# Patient Record
Sex: FEMALE | Race: WHITE | Employment: FULL TIME | URBAN - METROPOLITAN AREA
[De-identification: names, ages, dates, MRNs, and addresses within clinical notes are randomized per-mention and may not be internally consistent; named-entity substitution may affect disease eponyms.]

---

## 2018-01-29 ENCOUNTER — OFFICE VISIT (OUTPATIENT)
Dept: FAMILY MEDICINE CLINIC | Age: 38
End: 2018-01-29

## 2018-01-29 VITALS
RESPIRATION RATE: 15 BRPM | WEIGHT: 234 LBS | HEART RATE: 66 BPM | HEIGHT: 68 IN | SYSTOLIC BLOOD PRESSURE: 132 MMHG | DIASTOLIC BLOOD PRESSURE: 92 MMHG | OXYGEN SATURATION: 98 % | BODY MASS INDEX: 35.46 KG/M2

## 2018-01-29 DIAGNOSIS — L30.9 DERMATITIS: Primary | ICD-10-CM

## 2018-01-29 DIAGNOSIS — Z00.00 ANNUAL PHYSICAL EXAM: ICD-10-CM

## 2018-01-29 PROCEDURE — 99385 PREV VISIT NEW AGE 18-39: CPT | Performed by: FAMILY MEDICINE

## 2018-01-29 RX ORDER — TRAMADOL HYDROCHLORIDE 50 MG/1
50 TABLET ORAL EVERY 6 HOURS PRN
COMMUNITY
End: 2018-07-27

## 2018-01-29 RX ORDER — TRIAMCINOLONE ACETONIDE 5 MG/G
CREAM TOPICAL
Qty: 15 G | Refills: 1 | Status: SHIPPED | OUTPATIENT
Start: 2018-01-29 | End: 2018-02-27 | Stop reason: SDUPTHER

## 2018-01-29 ASSESSMENT — PATIENT HEALTH QUESTIONNAIRE - PHQ9
SUM OF ALL RESPONSES TO PHQ QUESTIONS 1-9: 0
1. LITTLE INTEREST OR PLEASURE IN DOING THINGS: 0
SUM OF ALL RESPONSES TO PHQ9 QUESTIONS 1 & 2: 0
2. FEELING DOWN, DEPRESSED OR HOPELESS: 0

## 2018-01-29 NOTE — PROGRESS NOTES
alert, non-toxic  HEENT:  Normocephalic, atraumatic. Pupils equal and round. TMs pearly with good landmarks. Moist mucous membranes. Normal dentition  NECK:  Supple, normal range of motion, no LAD, no meningeal signs, no JVD, nontender  CHEST/LUNGS: CTAB, no crackles, no wheeze, no rhonchi. Symmetric rise  CARDIOVASCULAR: RRR,  no murmur, no rub  ABDOMEN: Soft, non-tender, non-distended. No masses  EXTREMETIES: Normal movement of all extremities. No edema. No joint swelling. SKIN:  +erythematous raised rash on dorsum of BL toes, irregular border, +scaly with some excoriations. On of the lesions is weeping a brownish thick fluid. PSYCH:  A+O x 3; normal affect  NEURO:  GCS 15, CN2-12 grossly intact, no focal motor/sensory deficits, no cerebellar deficits, normal gait, normal speech        Assessment/Plan     44yo female here to establish care. Will update routine labs. PAP and tdap UTD. Pt has moderate dermatitis of BL feet. Does not appear infectious. Will treat with topical steroid for 2 weeks. See dermatology if no improvement. Discussed with patient medication/s dosage, instructions, potential S/E, A/R and Drug Interaction  Educational material provided regarding patient's condition  Tylenol or Motrin as needed for pain or fever  Advise to return here if worse or go to nearest ER  Encourage fluids  Pt discharged in stable condition at 16:55      1. Annual physical exam    - CBC Auto Differential; Future  - Comprehensive Metabolic Panel; Future  - Lipid, Fasting; Future  - TSH with Reflex; Future  - Hemoglobin A1C; Future    2. Dermatitis    - Leonardo- Rishi Echevarria MD  - triamcinolone (ARISTOCORT) 0.5 % cream; Apply topically 3 times daily.   Dispense: 15 g; Refill: 1      Orders Placed This Encounter   Procedures    CBC Auto Differential     Standing Status:   Future     Standing Expiration Date:   1/29/2019    Comprehensive Metabolic Panel     Standing Status:   Future     Standing Expiration

## 2018-01-29 NOTE — PATIENT INSTRUCTIONS
Patient Education        Dermatitis: Care Instructions  Your Care Instructions  Dermatitis is the general name used for any rash or inflammation of the skin. Different kinds of dermatitis cause different kinds of rashes. Common causes of a rash include new medicines, plants (such as poison oak or poison ivy), heat, and stress. Certain illnesses can also cause a rash. An allergic reaction to something that touches your skin, such as latex, nickel, or poison ivy, is called contact dermatitis. Contact dermatitis may also be caused by something that irritates the skin, such as bleach, a chemical, or soap. These types of rashes cannot be spread from person to person. How long your rash will last depends on what caused it. Rashes may last a few days or months. Follow-up care is a key part of your treatment and safety. Be sure to make and go to all appointments, and call your doctor if you are having problems. It's also a good idea to know your test results and keep a list of the medicines you take. How can you care for yourself at home? · Do not scratch the rash. Cut your nails short, and file them smooth. Or wear gloves if this helps keep you from scratching. · Wash the area with water only. Pat dry. · Put cold, wet cloths on the rash to reduce itching. · Keep cool, and stay out of the sun. · Leave the rash open to the air as much as possible. · If the rash itches, use hydrocortisone cream. Follow the directions on the label. Calamine lotion may help for plant rashes. · Take an over-the-counter antihistamine, such as diphenhydramine (Benadryl) or loratadine (Claritin), to help calm the itching. Read and follow all instructions on the label. · If your doctor prescribed a cream, use it as directed. If your doctor prescribed medicine, take it exactly as directed. When should you call for help?   Call your doctor now or seek immediate medical care if:  ? · You have symptoms of infection, such as:  ¨ Increased

## 2018-01-31 ENCOUNTER — TELEPHONE (OUTPATIENT)
Dept: FAMILY MEDICINE CLINIC | Age: 38
End: 2018-01-31

## 2018-01-31 NOTE — TELEPHONE ENCOUNTER
Pt says that she saw Dr. Myriam Benjamin recently and was given a referral to see a Dermatologist but is not able to be seen within one week. They are not accepting new patients until 07/18/2018.

## 2018-02-06 DIAGNOSIS — Z00.00 ANNUAL PHYSICAL EXAM: ICD-10-CM

## 2018-02-06 LAB
A/G RATIO: 1.5 (ref 1.1–2.2)
ALBUMIN SERPL-MCNC: 4.1 G/DL (ref 3.4–5)
ALP BLD-CCNC: 55 U/L (ref 40–129)
ALT SERPL-CCNC: 20 U/L (ref 10–40)
ANION GAP SERPL CALCULATED.3IONS-SCNC: 13 MMOL/L (ref 3–16)
AST SERPL-CCNC: 21 U/L (ref 15–37)
BASOPHILS ABSOLUTE: 0.1 K/UL (ref 0–0.2)
BASOPHILS RELATIVE PERCENT: 1 %
BILIRUB SERPL-MCNC: 0.4 MG/DL (ref 0–1)
BUN BLDV-MCNC: 7 MG/DL (ref 7–20)
CALCIUM SERPL-MCNC: 8.8 MG/DL (ref 8.3–10.6)
CHLORIDE BLD-SCNC: 104 MMOL/L (ref 99–110)
CHOLESTEROL, FASTING: 230 MG/DL (ref 0–199)
CO2: 26 MMOL/L (ref 21–32)
CREAT SERPL-MCNC: 0.6 MG/DL (ref 0.6–1.1)
EOSINOPHILS ABSOLUTE: 0.1 K/UL (ref 0–0.6)
EOSINOPHILS RELATIVE PERCENT: 1.2 %
GFR AFRICAN AMERICAN: >60
GFR NON-AFRICAN AMERICAN: >60
GLOBULIN: 2.7 G/DL
GLUCOSE BLD-MCNC: 94 MG/DL (ref 70–99)
HCT VFR BLD CALC: 38.5 % (ref 36–48)
HDLC SERPL-MCNC: 34 MG/DL (ref 40–60)
HEMOGLOBIN: 12.5 G/DL (ref 12–16)
LDL CHOLESTEROL CALCULATED: 152 MG/DL
LYMPHOCYTES ABSOLUTE: 3.1 K/UL (ref 1–5.1)
LYMPHOCYTES RELATIVE PERCENT: 37 %
MCH RBC QN AUTO: 26.6 PG (ref 26–34)
MCHC RBC AUTO-ENTMCNC: 32.4 G/DL (ref 31–36)
MCV RBC AUTO: 82.2 FL (ref 80–100)
MONOCYTES ABSOLUTE: 0.6 K/UL (ref 0–1.3)
MONOCYTES RELATIVE PERCENT: 7.3 %
NEUTROPHILS ABSOLUTE: 4.5 K/UL (ref 1.7–7.7)
NEUTROPHILS RELATIVE PERCENT: 53.5 %
PDW BLD-RTO: 15.4 % (ref 12.4–15.4)
PLATELET # BLD: 253 K/UL (ref 135–450)
PMV BLD AUTO: 8.6 FL (ref 5–10.5)
POTASSIUM SERPL-SCNC: 5.1 MMOL/L (ref 3.5–5.1)
RBC # BLD: 4.69 M/UL (ref 4–5.2)
SODIUM BLD-SCNC: 143 MMOL/L (ref 136–145)
TOTAL PROTEIN: 6.8 G/DL (ref 6.4–8.2)
TRIGLYCERIDE, FASTING: 219 MG/DL (ref 0–150)
TSH REFLEX: 2.91 UIU/ML (ref 0.27–4.2)
VLDLC SERPL CALC-MCNC: 44 MG/DL
WBC # BLD: 8.5 K/UL (ref 4–11)

## 2018-02-07 LAB
ESTIMATED AVERAGE GLUCOSE: 116.9 MG/DL
HBA1C MFR BLD: 5.7 %

## 2018-02-27 DIAGNOSIS — L30.9 DERMATITIS: ICD-10-CM

## 2018-02-27 RX ORDER — TRIAMCINOLONE ACETONIDE 5 MG/G
CREAM TOPICAL
Qty: 15 G | Refills: 0 | Status: SHIPPED | OUTPATIENT
Start: 2018-02-27 | End: 2018-05-25 | Stop reason: SDUPTHER

## 2018-05-23 LAB
ABO/RH: NORMAL
ANTIBODY SCREEN: NORMAL
HEPATITIS C ANTIBODY INTERPRETATION: NORMAL

## 2018-05-24 LAB
BASOPHILS ABSOLUTE: 0 K/UL (ref 0–0.2)
BASOPHILS RELATIVE PERCENT: 0.6 %
EOSINOPHILS ABSOLUTE: 0 K/UL (ref 0–0.6)
EOSINOPHILS RELATIVE PERCENT: 0.6 %
HCT VFR BLD CALC: 38.9 % (ref 36–48)
HEMOGLOBIN: 13.2 G/DL (ref 12–16)
HEPATITIS B SURFACE ANTIGEN INTERPRETATION: ABNORMAL
HIV AG/AB: NORMAL
HIV-1 ANTIBODY: NORMAL
LYMPHOCYTES ABSOLUTE: 2.5 K/UL (ref 1–5.1)
LYMPHOCYTES RELATIVE PERCENT: 33.2 %
MCH RBC QN AUTO: 27.6 PG (ref 26–34)
MCHC RBC AUTO-ENTMCNC: 34 G/DL (ref 31–36)
MCV RBC AUTO: 81.2 FL (ref 80–100)
MONOCYTES ABSOLUTE: 0.4 K/UL (ref 0–1.3)
MONOCYTES RELATIVE PERCENT: 5.3 %
NEUTROPHILS ABSOLUTE: 4.6 K/UL (ref 1.7–7.7)
NEUTROPHILS RELATIVE PERCENT: 60.3 %
PDW BLD-RTO: 15.8 % (ref 12.4–15.4)
PLATELET # BLD: 239 K/UL (ref 135–450)
PMV BLD AUTO: 8.7 FL (ref 5–10.5)
RBC # BLD: 4.79 M/UL (ref 4–5.2)
RPR: ABNORMAL
RUBELLA ANTIBODY IGG: 21.4 IU/ML
WBC # BLD: 7.7 K/UL (ref 4–11)

## 2018-05-25 DIAGNOSIS — L30.9 DERMATITIS: ICD-10-CM

## 2018-05-25 LAB
HPV COMMENT: NORMAL
HPV TYPE 16: NOT DETECTED
HPV TYPE 18: NOT DETECTED
HPVOH (OTHER TYPES): NOT DETECTED

## 2018-05-25 RX ORDER — TRIAMCINOLONE ACETONIDE 5 MG/G
CREAM TOPICAL
Qty: 15 G | Refills: 0 | Status: SHIPPED | OUTPATIENT
Start: 2018-05-25 | End: 2018-06-26 | Stop reason: SDUPTHER

## 2018-06-14 LAB — GLUCOSE CHALLENGE: 149 MG/DL

## 2018-06-25 ENCOUNTER — TELEPHONE (OUTPATIENT)
Dept: FAMILY MEDICINE CLINIC | Age: 38
End: 2018-06-25

## 2018-06-25 DIAGNOSIS — L30.9 DERMATITIS: ICD-10-CM

## 2018-06-26 RX ORDER — TRIAMCINOLONE ACETONIDE 5 MG/G
CREAM TOPICAL
Qty: 15 G | Refills: 0 | Status: SHIPPED | OUTPATIENT
Start: 2018-06-26 | End: 2018-07-27

## 2018-07-12 ENCOUNTER — HOSPITAL ENCOUNTER (OUTPATIENT)
Dept: OTHER | Age: 38
Discharge: HOME | End: 2018-07-12
Attending: OBSTETRICS & GYNECOLOGY | Admitting: OBSTETRICS & GYNECOLOGY

## 2018-07-12 DIAGNOSIS — O99.810 ABNORMAL GLUCOSE COMPLICATING PREGNANCY: ICD-10-CM

## 2018-07-12 LAB
GLUCOSE FASTING: 88 MG/DL
GLUCOSE TOLERANCE TEST 1 HOUR: 156 MG/DL
GLUCOSE TOLERANCE TEST 2 HOUR: 110 MG/DL
GLUCOSE TOLERANCE TEST 3 HOUR: 79 MG/DL

## 2018-08-21 ENCOUNTER — HOSPITAL ENCOUNTER (OUTPATIENT)
Dept: OTHER | Age: 38
Discharge: HOME | End: 2018-08-31

## 2018-08-22 ENCOUNTER — ROUTINE PRENATAL (OUTPATIENT)
Dept: PERINATAL CARE | Age: 38
End: 2018-08-22

## 2018-08-22 VITALS
DIASTOLIC BLOOD PRESSURE: 81 MMHG | HEART RATE: 73 BPM | WEIGHT: 237.8 LBS | SYSTOLIC BLOOD PRESSURE: 119 MMHG | BODY MASS INDEX: 36.16 KG/M2

## 2018-08-22 DIAGNOSIS — Z34.82 PRENATAL CARE, SUBSEQUENT PREGNANCY, SECOND TRIMESTER: Primary | ICD-10-CM

## 2018-08-22 PROCEDURE — 99999 PR OFFICE/OUTPT VISIT,PROCEDURE ONLY: CPT | Performed by: OBSTETRICS & GYNECOLOGY

## 2018-08-22 NOTE — PROGRESS NOTES
Consult and Level II USD,AMA, obesity,limited cardiac and kidney views on 20 week US. Reports + fetal movement, denies bleeding, leaking of fluid or pain. States infrequent BH Ctx.

## 2018-09-01 ENCOUNTER — HOSPITAL ENCOUNTER (OUTPATIENT)
Dept: OTHER | Age: 38
Discharge: HOME | End: 2018-09-01

## 2018-11-07 ENCOUNTER — HOSPITAL ENCOUNTER (OUTPATIENT)
Age: 38
Discharge: HOME | End: 2018-11-07
Attending: OBSTETRICS & GYNECOLOGY | Admitting: OBSTETRICS & GYNECOLOGY
Payer: MEDICARE

## 2018-11-07 VITALS
RESPIRATION RATE: 18 BRPM | SYSTOLIC BLOOD PRESSURE: 117 MMHG | DIASTOLIC BLOOD PRESSURE: 75 MMHG | BODY MASS INDEX: 34.67 KG/M2 | HEART RATE: 73 BPM | TEMPERATURE: 97.2 F | WEIGHT: 228 LBS

## 2018-11-07 PROCEDURE — 59025 FETAL NON-STRESS TEST: CPT

## 2018-11-07 PROCEDURE — 99211 OFF/OP EST MAY X REQ PHY/QHP: CPT

## 2018-11-08 NOTE — PROCEDURES
FETAL SURVEILLANCE TESTING SUMMARY    INDICATIONS:  decreased fetal movement    OBJECTIVE RESULTS:  Fetal heart variability: moderate  Fetal Heart Rate decelerations: rare mild variables  Fetal Heart Rate accelerations: yes  Baseline FHR: 140 per minute  Fetal Non-stress Test: reactive    Fetal surveillance: reassuring

## 2018-11-08 NOTE — PROGRESS NOTES
Department of Obstetrics and Gynecology  Labor and Delivery Triage Note        CHIEF COMPLAINT:  decreased fetal movement    HISTORY OF PRESENT ILLNESS:      The patient is a 45 y.o. female 33w3d. OB History      Para Term  AB Living    8 6 2 1 1 6    SAB TAB Ectopic Molar Multiple Live Births    1         6        Patient presents with a chief complaint as above. C/O fever, body aches starting 3 days ago, fever broke last night but still not feeling great. Presents to triage today with c/o not feeling baby moving as much today    Estimated Due Date:  Estimated Date of Delivery: 18    PAST MEDICAL HISTORY:   Past Medical History:   Diagnosis Date    Anemia     Cervix dysplasia     Depression     Kidney stones        PAST  SURGICAL HISTORY:   Past Surgical History:   Procedure Laterality Date    CHOLECYSTECTOMY      LASIK      TONSILLECTOMY         SOCIAL HISTORY:     reports that she has never smoked. She has never used smokeless tobacco. She reports that she does not drink alcohol or use drugs. MEDICATIONS:    Prior to Admission medications    Medication Sig Start Date End Date Taking? Authorizing Provider   Prenatal Multivit-Min-Fe-FA (PRENATAL VITAMINS PO) Take 1 tablet by mouth daily   Yes Historical Provider, MD   progesterone (PROMETRIUM) 200 MG capsule Take 1 capsule by mouth nightly for 21 doses 18  Marvel Dhaliwal DO        PRENATAL CARE:    Complicated by: none    REVIEW OF SYSTEMS:     Pertinent items are noted in HPI. PHYSICAL EXAM:    Vital Signs: Blood pressure 117/75, pulse 73, temperature 96.8 °F (36 °C), temperature source Temporal, resp. rate 18, weight 228 lb (103.4 kg), last menstrual period 2018, not currently breastfeeding. Abdomen soft, non tender, consistent with her EGA.     Fetal heart rate:  Baseline Heart Rate 140, accelerations:  present  long term variability:  moderate  decelerations:  Rare mild variables    Contraction

## 2018-11-16 ENCOUNTER — HOSPITAL ENCOUNTER (OUTPATIENT)
Age: 38
Discharge: HOME | End: 2018-11-16
Attending: OBSTETRICS & GYNECOLOGY | Admitting: OBSTETRICS & GYNECOLOGY
Payer: MEDICARE

## 2018-11-16 VITALS
BODY MASS INDEX: 34.87 KG/M2 | DIASTOLIC BLOOD PRESSURE: 72 MMHG | RESPIRATION RATE: 18 BRPM | TEMPERATURE: 96.7 F | HEIGHT: 68 IN | SYSTOLIC BLOOD PRESSURE: 127 MMHG | HEART RATE: 68 BPM | WEIGHT: 230.1 LBS

## 2018-11-16 PROBLEM — O47.9 THREATENED LABOR: Status: ACTIVE | Noted: 2018-11-16

## 2018-11-16 PROCEDURE — 6360000002 HC RX W HCPCS

## 2018-11-16 PROCEDURE — 59025 FETAL NON-STRESS TEST: CPT

## 2018-11-16 PROCEDURE — 99211 OFF/OP EST MAY X REQ PHY/QHP: CPT

## 2018-11-16 PROCEDURE — 96372 THER/PROPH/DIAG INJ SC/IM: CPT

## 2018-11-16 RX ORDER — TERBUTALINE SULFATE 1 MG/ML
0.25 INJECTION, SOLUTION SUBCUTANEOUS ONCE
Status: COMPLETED | OUTPATIENT
Start: 2018-11-16 | End: 2018-11-16

## 2018-11-16 RX ORDER — TERBUTALINE SULFATE 1 MG/ML
INJECTION, SOLUTION SUBCUTANEOUS
Status: COMPLETED
Start: 2018-11-16 | End: 2018-11-16

## 2018-11-16 RX ADMIN — TERBUTALINE SULFATE 0.25 MG: 1 INJECTION SUBCUTANEOUS at 17:43

## 2018-11-16 RX ADMIN — TERBUTALINE SULFATE 0.25 MG: 1 INJECTION, SOLUTION SUBCUTANEOUS at 17:43

## 2018-11-16 NOTE — FLOWSHEET NOTE
Pt. Evaluated for discomfort and states contractions less intense and not lasting as long. Irritability pattern present on monitor, 2nd pitcher of water given.  Will continue to monitor

## 2018-12-07 ENCOUNTER — HOSPITAL ENCOUNTER (INPATIENT)
Age: 38
LOS: 1 days | Discharge: HOME | DRG: 560 | End: 2018-12-08
Attending: OBSTETRICS & GYNECOLOGY | Admitting: OBSTETRICS & GYNECOLOGY
Payer: MEDICARE

## 2018-12-07 ENCOUNTER — ANESTHESIA (OUTPATIENT)
Dept: LABOR AND DELIVERY | Age: 38
DRG: 560 | End: 2018-12-07
Payer: MEDICARE

## 2018-12-07 ENCOUNTER — ANESTHESIA EVENT (OUTPATIENT)
Dept: LABOR AND DELIVERY | Age: 38
DRG: 560 | End: 2018-12-07
Payer: MEDICARE

## 2018-12-07 PROBLEM — O36.5930 POOR FETAL GROWTH AFFECTING MANAGEMENT OF MOTHER IN THIRD TRIMESTER: Status: ACTIVE | Noted: 2018-12-07

## 2018-12-07 PROBLEM — Z37.9 NORMAL LABOR: Status: ACTIVE | Noted: 2018-12-07

## 2018-12-07 LAB
ABO/RH: NORMAL
AMPHETAMINE SCREEN, URINE: NORMAL
ANTIBODY SCREEN: NORMAL
BARBITURATE SCREEN URINE: NORMAL
BASOPHILS ABSOLUTE: 0.1 K/UL (ref 0–0.2)
BASOPHILS RELATIVE PERCENT: 0.4 %
BENZODIAZEPINE SCREEN, URINE: NORMAL
BUPRENORPHINE URINE: NORMAL
CANNABINOID SCREEN URINE: NORMAL
COCAINE METABOLITE SCREEN URINE: NORMAL
EOSINOPHILS ABSOLUTE: 0 K/UL (ref 0–0.6)
EOSINOPHILS RELATIVE PERCENT: 0.2 %
HCT VFR BLD CALC: 35.1 % (ref 36–48)
HEMOGLOBIN: 11.5 G/DL (ref 12–16)
LYMPHOCYTES ABSOLUTE: 2.4 K/UL (ref 1–5.1)
LYMPHOCYTES RELATIVE PERCENT: 18.4 %
Lab: NORMAL
MCH RBC QN AUTO: 25.9 PG (ref 26–34)
MCHC RBC AUTO-ENTMCNC: 32.9 G/DL (ref 31–36)
MCV RBC AUTO: 78.7 FL (ref 80–100)
METHADONE SCREEN, URINE: NORMAL
MONOCYTES ABSOLUTE: 0.5 K/UL (ref 0–1.3)
MONOCYTES RELATIVE PERCENT: 3.7 %
NEUTROPHILS ABSOLUTE: 10 K/UL (ref 1.7–7.7)
NEUTROPHILS RELATIVE PERCENT: 77.3 %
OPIATE SCREEN URINE: NORMAL
OXYCODONE URINE: NORMAL
PDW BLD-RTO: 14.7 % (ref 12.4–15.4)
PH UA: 6
PHENCYCLIDINE SCREEN URINE: NORMAL
PLATELET # BLD: 277 K/UL (ref 135–450)
PMV BLD AUTO: 9.2 FL (ref 5–10.5)
PROPOXYPHENE SCREEN: NORMAL
RBC # BLD: 4.46 M/UL (ref 4–5.2)
WBC # BLD: 13 K/UL (ref 4–11)

## 2018-12-07 PROCEDURE — 86850 RBC ANTIBODY SCREEN: CPT

## 2018-12-07 PROCEDURE — 2500000003 HC RX 250 WO HCPCS: Performed by: NURSE ANESTHETIST, CERTIFIED REGISTERED

## 2018-12-07 PROCEDURE — 80307 DRUG TEST PRSMV CHEM ANLYZR: CPT

## 2018-12-07 PROCEDURE — 1200000000 HC SEMI PRIVATE

## 2018-12-07 PROCEDURE — 85025 COMPLETE CBC W/AUTO DIFF WBC: CPT

## 2018-12-07 PROCEDURE — 2580000003 HC RX 258

## 2018-12-07 PROCEDURE — 3700000025 ANESTHESIA EPIDURAL BLOCK: Performed by: ANESTHESIOLOGY

## 2018-12-07 PROCEDURE — 1220000000 HC SEMI PRIVATE OB R&B

## 2018-12-07 PROCEDURE — 86780 TREPONEMA PALLIDUM: CPT

## 2018-12-07 PROCEDURE — 86900 BLOOD TYPING SEROLOGIC ABO: CPT

## 2018-12-07 PROCEDURE — 3E033VJ INTRODUCTION OF OTHER HORMONE INTO PERIPHERAL VEIN, PERCUTANEOUS APPROACH: ICD-10-PCS | Performed by: OBSTETRICS & GYNECOLOGY

## 2018-12-07 PROCEDURE — 6360000002 HC RX W HCPCS: Performed by: OBSTETRICS & GYNECOLOGY

## 2018-12-07 PROCEDURE — 86901 BLOOD TYPING SEROLOGIC RH(D): CPT

## 2018-12-07 PROCEDURE — 10907ZC DRAINAGE OF AMNIOTIC FLUID, THERAPEUTIC FROM PRODUCTS OF CONCEPTION, VIA NATURAL OR ARTIFICIAL OPENING: ICD-10-PCS | Performed by: OBSTETRICS & GYNECOLOGY

## 2018-12-07 PROCEDURE — 6370000000 HC RX 637 (ALT 250 FOR IP): Performed by: OBSTETRICS & GYNECOLOGY

## 2018-12-07 PROCEDURE — 7200000001 HC VAGINAL DELIVERY

## 2018-12-07 PROCEDURE — 6370000000 HC RX 637 (ALT 250 FOR IP)

## 2018-12-07 RX ORDER — METHYLERGONOVINE MALEATE 0.2 MG/ML
200 INJECTION INTRAVENOUS
Status: ACTIVE | OUTPATIENT
Start: 2018-12-07 | End: 2018-12-07

## 2018-12-07 RX ORDER — PHYTONADIONE 1 MG/.5ML
INJECTION, EMULSION INTRAMUSCULAR; INTRAVENOUS; SUBCUTANEOUS
Status: DISCONTINUED
Start: 2018-12-07 | End: 2018-12-07 | Stop reason: WASHOUT

## 2018-12-07 RX ORDER — ACETAMINOPHEN 325 MG/1
650 TABLET ORAL EVERY 4 HOURS PRN
Status: DISCONTINUED | OUTPATIENT
Start: 2018-12-07 | End: 2018-12-09 | Stop reason: HOSPADM

## 2018-12-07 RX ORDER — SODIUM CHLORIDE 0.9 % (FLUSH) 0.9 %
10 SYRINGE (ML) INJECTION PRN
Status: DISCONTINUED | OUTPATIENT
Start: 2018-12-07 | End: 2018-12-07

## 2018-12-07 RX ORDER — IBUPROFEN 800 MG/1
800 TABLET ORAL EVERY 6 HOURS PRN
Qty: 40 TABLET | Refills: 0 | Status: SHIPPED | OUTPATIENT
Start: 2018-12-07 | End: 2020-11-02

## 2018-12-07 RX ORDER — CARBOPROST TROMETHAMINE 250 UG/ML
250 INJECTION, SOLUTION INTRAMUSCULAR
Status: ACTIVE | OUTPATIENT
Start: 2018-12-07 | End: 2018-12-07

## 2018-12-07 RX ORDER — DOCUSATE SODIUM 100 MG/1
100 CAPSULE, LIQUID FILLED ORAL 2 TIMES DAILY
Status: DISCONTINUED | OUTPATIENT
Start: 2018-12-07 | End: 2018-12-07

## 2018-12-07 RX ORDER — ONDANSETRON 2 MG/ML
4 INJECTION INTRAMUSCULAR; INTRAVENOUS EVERY 6 HOURS PRN
Status: DISCONTINUED | OUTPATIENT
Start: 2018-12-07 | End: 2018-12-09 | Stop reason: HOSPADM

## 2018-12-07 RX ORDER — BUPIVACAINE HYDROCHLORIDE 2.5 MG/ML
INJECTION, SOLUTION EPIDURAL; INFILTRATION; INTRACAUDAL PRN
Status: DISCONTINUED | OUTPATIENT
Start: 2018-12-07 | End: 2018-12-07 | Stop reason: SDUPTHER

## 2018-12-07 RX ORDER — SODIUM CHLORIDE, SODIUM LACTATE, POTASSIUM CHLORIDE, CALCIUM CHLORIDE 600; 310; 30; 20 MG/100ML; MG/100ML; MG/100ML; MG/100ML
INJECTION, SOLUTION INTRAVENOUS CONTINUOUS
Status: DISCONTINUED | OUTPATIENT
Start: 2018-12-07 | End: 2018-12-07

## 2018-12-07 RX ORDER — LANOLIN 100 %
OINTMENT (GRAM) TOPICAL PRN
Status: DISCONTINUED | OUTPATIENT
Start: 2018-12-07 | End: 2018-12-09 | Stop reason: HOSPADM

## 2018-12-07 RX ORDER — FERROUS SULFATE 325(65) MG
325 TABLET ORAL DAILY
Status: DISCONTINUED | OUTPATIENT
Start: 2018-12-08 | End: 2018-12-09 | Stop reason: HOSPADM

## 2018-12-07 RX ORDER — ERYTHROMYCIN 5 MG/G
OINTMENT OPHTHALMIC
Status: DISCONTINUED
Start: 2018-12-07 | End: 2018-12-07 | Stop reason: WASHOUT

## 2018-12-07 RX ORDER — SODIUM CHLORIDE 0.9 % (FLUSH) 0.9 %
SYRINGE (ML) INJECTION
Status: DISCONTINUED
Start: 2018-12-07 | End: 2018-12-07

## 2018-12-07 RX ORDER — SENNA AND DOCUSATE SODIUM 50; 8.6 MG/1; MG/1
1 TABLET, FILM COATED ORAL DAILY PRN
Status: DISCONTINUED | OUTPATIENT
Start: 2018-12-07 | End: 2018-12-09 | Stop reason: HOSPADM

## 2018-12-07 RX ORDER — IBUPROFEN 600 MG/1
600 TABLET ORAL EVERY 6 HOURS SCHEDULED
Status: DISCONTINUED | OUTPATIENT
Start: 2018-12-08 | End: 2018-12-09 | Stop reason: HOSPADM

## 2018-12-07 RX ORDER — MISOPROSTOL 100 UG/1
800 TABLET ORAL PRN
Status: DISCONTINUED | OUTPATIENT
Start: 2018-12-07 | End: 2018-12-09 | Stop reason: HOSPADM

## 2018-12-07 RX ORDER — SODIUM CHLORIDE 0.9 % (FLUSH) 0.9 %
10 SYRINGE (ML) INJECTION EVERY 12 HOURS SCHEDULED
Status: DISCONTINUED | OUTPATIENT
Start: 2018-12-07 | End: 2018-12-07

## 2018-12-07 RX ORDER — LIDOCAINE HYDROCHLORIDE AND EPINEPHRINE 20; 5 MG/ML; UG/ML
INJECTION, SOLUTION EPIDURAL; INFILTRATION; INTRACAUDAL; PERINEURAL PRN
Status: DISCONTINUED | OUTPATIENT
Start: 2018-12-07 | End: 2018-12-07 | Stop reason: SDUPTHER

## 2018-12-07 RX ORDER — FAMOTIDINE 20 MG/1
20 TABLET, FILM COATED ORAL 2 TIMES DAILY PRN
Status: DISCONTINUED | OUTPATIENT
Start: 2018-12-07 | End: 2018-12-09 | Stop reason: HOSPADM

## 2018-12-07 RX ORDER — SIMETHICONE 80 MG
80 TABLET,CHEWABLE ORAL EVERY 6 HOURS PRN
Status: DISCONTINUED | OUTPATIENT
Start: 2018-12-07 | End: 2018-12-09 | Stop reason: HOSPADM

## 2018-12-07 RX ORDER — OXYCODONE HYDROCHLORIDE AND ACETAMINOPHEN 5; 325 MG/1; MG/1
1 TABLET ORAL EVERY 4 HOURS PRN
Status: DISCONTINUED | OUTPATIENT
Start: 2018-12-07 | End: 2018-12-09 | Stop reason: HOSPADM

## 2018-12-07 RX ORDER — OXYCODONE HYDROCHLORIDE AND ACETAMINOPHEN 5; 325 MG/1; MG/1
2 TABLET ORAL EVERY 4 HOURS PRN
Status: DISCONTINUED | OUTPATIENT
Start: 2018-12-07 | End: 2018-12-09 | Stop reason: HOSPADM

## 2018-12-07 RX ORDER — SODIUM CHLORIDE 0.9 % (FLUSH) 0.9 %
10 SYRINGE (ML) INJECTION PRN
Status: DISCONTINUED | OUTPATIENT
Start: 2018-12-07 | End: 2018-12-09 | Stop reason: HOSPADM

## 2018-12-07 RX ORDER — ONDANSETRON 2 MG/ML
4 INJECTION INTRAMUSCULAR; INTRAVENOUS EVERY 6 HOURS PRN
Status: DISCONTINUED | OUTPATIENT
Start: 2018-12-07 | End: 2018-12-07

## 2018-12-07 RX ORDER — ONDANSETRON 4 MG/1
4 TABLET, ORALLY DISINTEGRATING ORAL EVERY 6 HOURS PRN
Status: DISCONTINUED | OUTPATIENT
Start: 2018-12-07 | End: 2018-12-09 | Stop reason: HOSPADM

## 2018-12-07 RX ORDER — SODIUM CHLORIDE, SODIUM LACTATE, POTASSIUM CHLORIDE, CALCIUM CHLORIDE 600; 310; 30; 20 MG/100ML; MG/100ML; MG/100ML; MG/100ML
INJECTION, SOLUTION INTRAVENOUS
Status: COMPLETED
Start: 2018-12-07 | End: 2018-12-07

## 2018-12-07 RX ORDER — OXYCODONE HYDROCHLORIDE AND ACETAMINOPHEN 5; 325 MG/1; MG/1
1-2 TABLET ORAL EVERY 4 HOURS PRN
Qty: 10 TABLET | Refills: 0 | Status: SHIPPED | OUTPATIENT
Start: 2018-12-07 | End: 2019-12-07

## 2018-12-07 RX ORDER — DOCUSATE SODIUM 100 MG/1
100 CAPSULE, LIQUID FILLED ORAL 2 TIMES DAILY
Status: DISCONTINUED | OUTPATIENT
Start: 2018-12-07 | End: 2018-12-09 | Stop reason: HOSPADM

## 2018-12-07 RX ORDER — SODIUM CHLORIDE 0.9 % (FLUSH) 0.9 %
10 SYRINGE (ML) INJECTION EVERY 12 HOURS SCHEDULED
Status: DISCONTINUED | OUTPATIENT
Start: 2018-12-07 | End: 2018-12-09 | Stop reason: HOSPADM

## 2018-12-07 RX ADMIN — Medication 1 MILLI-UNITS/MIN: at 15:36

## 2018-12-07 RX ADMIN — SODIUM CHLORIDE, POTASSIUM CHLORIDE, SODIUM LACTATE AND CALCIUM CHLORIDE: 600; 310; 30; 20 INJECTION, SOLUTION INTRAVENOUS at 14:11

## 2018-12-07 RX ADMIN — BENZOCAINE AND LEVOMENTHOL: 200; 5 SPRAY TOPICAL at 22:25

## 2018-12-07 RX ADMIN — BUPIVACAINE HYDROCHLORIDE 10 ML: 2.5 INJECTION, SOLUTION EPIDURAL; INFILTRATION; INTRACAUDAL; PERINEURAL at 19:27

## 2018-12-07 RX ADMIN — SODIUM CHLORIDE, SODIUM LACTATE, POTASSIUM CHLORIDE, CALCIUM CHLORIDE: 600; 310; 30; 20 INJECTION, SOLUTION INTRAVENOUS at 14:11

## 2018-12-07 RX ADMIN — DOCUSATE SODIUM 100 MG: 100 CAPSULE, LIQUID FILLED ORAL at 22:24

## 2018-12-07 RX ADMIN — LIDOCAINE HYDROCHLORIDE AND EPINEPHRINE 5 ML: 20; 5 INJECTION, SOLUTION EPIDURAL; INFILTRATION; INTRACAUDAL; PERINEURAL at 19:24

## 2018-12-07 RX ADMIN — Medication 12 ML/HR: at 19:35

## 2018-12-07 RX ADMIN — ACETAMINOPHEN 650 MG: 325 TABLET, FILM COATED ORAL at 22:24

## 2018-12-07 ASSESSMENT — PAIN SCALES - GENERAL: PAINLEVEL_OUTOF10: 0

## 2018-12-07 NOTE — ANESTHESIA PRE PROCEDURE
Department of Anesthesiology  Preprocedure Note       Name:  Will Phillip   Age:  45 y.o.  :  1980                                          MRN:  [id number]         Date:  2018      Surgeon: * No surgeons listed *    Procedure: ANESTHESIA LABOR ANALGESIA    Medications prior to admission:   Prior to Admission medications    Medication Sig Start Date End Date Taking? Authorizing Provider   NONFORMULARY 1 tablet 3 times daily as needed (30 minutes before meals)   Yes Historical Provider, MD   Prenatal Multivit-Min-Fe-FA (PRENATAL VITAMINS PO) Take 1 tablet by mouth daily   Yes Historical Provider, MD       Current medications:    Current Facility-Administered Medications   Medication Dose Route Frequency Provider Last Rate Last Dose    lactated ringers infusion   Intravenous Continuous Catalina Joyce Osborne MD        sodium chloride flush 0.9 % injection 10 mL  10 mL Intravenous 2 times per day Naldo Rubio MD        sodium chloride flush 0.9 % injection 10 mL  10 mL Intravenous PRN Naldo Rubio MD        docusate sodium (COLACE) capsule 100 mg  100 mg Oral BID Naldo Rubio MD        ondansetron (ZOFRAN) injection 4 mg  4 mg Intravenous Q6H PRN Naldo Rubio MD        oxytocin (PITOCIN) 30 units in 500 mL infusion  1 anjelica-units/min Intravenous Continuous PRN Catalina Joyce Osborne MD        sodium chloride flush 0.9 % injection                Allergies:     Allergies   Allergen Reactions    Demerol      UNKNOWN    Monistat [Tioconazole]     Pineapple Itching       Problem List:    Patient Active Problem List   Diagnosis Code    Injury of ankle, right S99.911A    Fracture, fibula S82.409A    Chest pain R07.9    Threatened labor O47.9    Poor fetal growth affecting management of mother in third trimester O45.26    Normal labor O80, Z37.9       Past Medical History:        Diagnosis Date    Abnormal Pap smear of cervix     in past, recent normal    Anemia     Cervix Date                       WBC                      10.8                07/27/2018                 HGB                      12.4                07/27/2018                 HCT                      36.7                07/27/2018                 MCV                      82.9                07/27/2018                 PLT                      221                 07/27/2018            )        Anesthetic plan and risks discussed with patient. Use of blood products discussed with patient whom consented to blood products.                    Sameer Pollock, APRN - CRNA   12/7/2018

## 2018-12-08 VITALS
HEIGHT: 68 IN | WEIGHT: 232 LBS | TEMPERATURE: 98.2 F | BODY MASS INDEX: 35.16 KG/M2 | HEART RATE: 72 BPM | OXYGEN SATURATION: 100 % | DIASTOLIC BLOOD PRESSURE: 80 MMHG | RESPIRATION RATE: 18 BRPM | SYSTOLIC BLOOD PRESSURE: 118 MMHG

## 2018-12-08 LAB — TOTAL SYPHILLIS IGG/IGM: NORMAL

## 2018-12-08 PROCEDURE — 2580000003 HC RX 258: Performed by: OBSTETRICS & GYNECOLOGY

## 2018-12-08 PROCEDURE — 6370000000 HC RX 637 (ALT 250 FOR IP): Performed by: OBSTETRICS & GYNECOLOGY

## 2018-12-08 PROCEDURE — 7200000001 HC VAGINAL DELIVERY

## 2018-12-08 PROCEDURE — 1200000000 HC SEMI PRIVATE

## 2018-12-08 RX ADMIN — IBUPROFEN 600 MG: 600 TABLET ORAL at 12:30

## 2018-12-08 RX ADMIN — Medication 10 ML: at 08:50

## 2018-12-08 RX ADMIN — DOCUSATE SODIUM 100 MG: 100 CAPSULE, LIQUID FILLED ORAL at 08:50

## 2018-12-08 RX ADMIN — DOCUSATE SODIUM 100 MG: 100 CAPSULE, LIQUID FILLED ORAL at 20:09

## 2018-12-08 RX ADMIN — IBUPROFEN 600 MG: 600 TABLET ORAL at 04:30

## 2018-12-08 RX ADMIN — IBUPROFEN 600 MG: 600 TABLET ORAL at 20:09

## 2018-12-08 ASSESSMENT — PAIN SCALES - GENERAL
PAINLEVEL_OUTOF10: 0
PAINLEVEL_OUTOF10: 2
PAINLEVEL_OUTOF10: 0

## 2018-12-08 NOTE — PROGRESS NOTES
[facility] post-partum note    Post-partum Day #0    Subjective:    Doing well, very keen to go home tonight  Lochia: Normal    Objective:  Vitals:    12/07/18 2230 12/08/18 0020 12/08/18 0820 12/08/18 1225   BP: 125/68 123/81 96/64 106/71   Pulse: 72 70 74 74   Resp: 16 18 16 18   Temp: 97.5 °F (36.4 °C) 98.4 °F (36.9 °C) 97 °F (36.1 °C) 98.4 °F (36.9 °C)   TempSrc: Axillary Oral Oral Oral   SpO2:       Weight:       Height:           Physical Examination:  Appears well  Uterus: Firm, NT  Calves: NT    Lab Results   Component Value Date    WBC 13.0 (H) 12/07/2018    HGB 11.5 (L) 12/07/2018    HCT 35.1 (L) 12/07/2018    MCV 78.7 (L) 12/07/2018     12/07/2018          Current Facility-Administered Medications:     sodium chloride flush 0.9 % injection 10 mL, 10 mL, Intravenous, 2 times per day, Charity Wilson MD, 10 mL at 12/08/18 0850    sodium chloride flush 0.9 % injection 10 mL, 10 mL, Intravenous, PRN, Charity Wilson MD    rho(D) immune globulin (HYPERRHO S/D) injection 300 mcg, 300 mcg, Intramuscular, Once, Charity Wilson MD    acetaminophen (TYLENOL) tablet 650 mg, 650 mg, Oral, Q4H PRN, Jason Itzel GUZMAN MD, 650 mg at 12/07/18 2224    ibuprofen (ADVIL;MOTRIN) tablet 600 mg, 600 mg, Oral, 4 times per day, Charity Wilson MD, 600 mg at 12/08/18 1230    oxyCODONE-acetaminophen (PERCOCET) 5-325 MG per tablet 1 tablet, 1 tablet, Oral, Q4H PRN **OR** oxyCODONE-acetaminophen (PERCOCET) 5-325 MG per tablet 2 tablet, 2 tablet, Oral, Q4H PRN, Charity Wilson MD    simethicone (MYLICON) chewable tablet 80 mg, 80 mg, Oral, Q6H PRN, Charity Wilson MD    docusate sodium (COLACE) capsule 100 mg, 100 mg, Oral, BID, Jason Itzel GUZMAN MD, 100 mg at 12/08/18 0850    magnesium hydroxide (MILK OF MAGNESIA) 400 MG/5ML suspension 30 mL, 30 mL, Oral, Daily PRN, Charity Wilson MD    sennosides-docusate sodium (SENOKOT-S) 8.6-50 MG tablet 1 tablet, 1 tablet, Oral, Daily

## 2018-12-08 NOTE — ANESTHESIA PROCEDURE NOTES
Epidural Block    Patient location during procedure: OB  Start time: 2018 7:13 PM  End time: 2018 7:37 PM  Reason for block: labor epidural  Staffing  Anesthesiologist: Nino Galindo  Resident/CRNA: Martín Moyer  Performed: resident/CRNA   Preanesthetic Checklist  Completed: patient identified, site marked, pre-op evaluation, timeout performed, IV checked, risks and benefits discussed, monitors and equipment checked, anesthesia consent given, oxygen available and patient being monitored  Epidural  Patient position: sitting  Prep: ChloraPrep  Patient monitoring: continuous pulse ox and frequent blood pressure checks  Approach: midline  Location: lumbar (1-5)  Injection technique: IDALIA saline  Provider prep: mask and sterile gloves  Needle  Needle type: Tuohy   Needle gauge: 17 G  Needle length: 3.5 in  Needle insertion depth: 7 cm  Catheter type: end hole  Catheter size: 19 G  Catheter at skin depth: 12 cm  Test dose: negative  Assessment  Sensory level: T10  Hemodynamics: stable  Attempts: 1  Additional Notes  Called for labor epidural analgesia request. Medical and Surgical history reviewed with pt. Risks/benefits of epidural discussed including allergic reaction, infection, bleeding, hypotension, headache, back pain, nerve damage, failed or one-sided block. Also discussed anesthesia options and associated risks in the event of . Questions answered. Verbalizes understanding and requests to proceed. VSS & FHR stable. Pt in sitting position. Labor epidural placed using IDALIA sterile technique (donned mask, hat, and sterile gloves). Back prepped with Chloraprepx2. Sterile drape applied. Site infiltrated with 1%Lidocaine 5ml. 17G Tuohy needle inserted, IDALIA technique with saline. No heme, CSF, or paresthesias noted. Epidural space dilated with saline. Threaded epidural catheter through Tuohy needle easily. Tuohy needle withdrawn. Negative aspiration.  5ml of 1.5% Lido with epi 1:200,000 test dose given. Negative test dose. Catheter taped at the skin. Secured with steri strips, tegaderm, and tape. Patient in supine position with left uterine displacement. VSS & FHR stable.      VAS: start 9 /10, end  0/10

## 2019-06-19 ENCOUNTER — TELEPHONE (OUTPATIENT)
Dept: FAMILY MEDICINE CLINIC | Age: 39
End: 2019-06-19

## 2020-10-09 ENCOUNTER — APPOINTMENT (OUTPATIENT)
Dept: GENERAL RADIOLOGY | Age: 40
End: 2020-10-09
Payer: MEDICARE

## 2020-10-09 ENCOUNTER — HOSPITAL ENCOUNTER (EMERGENCY)
Age: 40
Discharge: HOME | End: 2020-10-09
Payer: MEDICARE

## 2020-10-09 VITALS
OXYGEN SATURATION: 98 % | DIASTOLIC BLOOD PRESSURE: 85 MMHG | WEIGHT: 225 LBS | RESPIRATION RATE: 12 BRPM | HEART RATE: 73 BPM | SYSTOLIC BLOOD PRESSURE: 121 MMHG | BODY MASS INDEX: 34.1 KG/M2 | TEMPERATURE: 97.4 F | HEIGHT: 68 IN

## 2020-10-09 PROCEDURE — 73140 X-RAY EXAM OF FINGER(S): CPT

## 2020-10-09 PROCEDURE — 99283 EMERGENCY DEPT VISIT LOW MDM: CPT

## 2020-10-09 RX ORDER — NAPROXEN 500 MG/1
500 TABLET ORAL 2 TIMES DAILY PRN
Qty: 20 TABLET | Refills: 0 | Status: SHIPPED | OUTPATIENT
Start: 2020-10-09 | End: 2020-11-02

## 2020-10-09 ASSESSMENT — ENCOUNTER SYMPTOMS
COLOR CHANGE: 1
VOMITING: 0
DIARRHEA: 0
CHEST TIGHTNESS: 0
SHORTNESS OF BREATH: 0
NAUSEA: 0
ABDOMINAL PAIN: 0

## 2020-10-09 ASSESSMENT — PAIN DESCRIPTION - PAIN TYPE: TYPE: ACUTE PAIN

## 2020-10-09 ASSESSMENT — PAIN DESCRIPTION - ORIENTATION: ORIENTATION: LEFT

## 2020-10-09 ASSESSMENT — PAIN SCALES - GENERAL: PAINLEVEL_OUTOF10: 5

## 2020-10-09 ASSESSMENT — PAIN DESCRIPTION - LOCATION: LOCATION: FINGER (COMMENT WHICH ONE)

## 2020-10-09 NOTE — ED PROVIDER NOTES
[facility]        Pt Name: Josiane Arellano  MRN: [id number]  Griceldagfjohnny 1980  Date of evaluation: 10/9/2020  Provider: Callie Le PA-C  PCP: Danial Smith MD    GREGORIO. I have evaluated this patient. My supervising physician was available for consultation. CHIEF COMPLAINT       Chief Complaint   Patient presents with    Finger Pain     left 4th digit pain, no injury onset tuesday        HISTORY OF PRESENT ILLNESS   (Location, Timing/Onset, Context/Setting, Quality, Duration, Modifying Factors, Severity, Associated Signs and Symptoms)  Note limiting factors. Josiane Arellano is a 44 y.o. female who is right-hand dominant presents the emergency department with difficulties as a pertains to ring finger pain. Patient states she cannot remember any particular injury. She states she is sure that there could have been something low-grade but nothing that was substantial.  She states that since Tuesday she has noticed some discoloration on the dorsal aspect of her hand over the MCP and some pain and associated swelling just distal to it over the proximal phalanx. She states that she has not had any skin changes. Denies any redness. Denies any warmth but does have swelling that she says has not worsened since it began. She states she is treated this with Advil and Naprosyn in the home environment but really has not improved. She states that she is not having any numbness and or tingling. She has not had any difficulties such as this in the past and because of achy pain and discomfort rating it to be 5 out of 10 she presents to the ED for evaluation and treatment. She denies numbness and or tingling. She denies a history of gout. She has no additional musculoskeletal complaints arthralgias or associated joint swelling or effusion. No additional concerns at all at present.     Nursing Notes were all reviewed and agreed with or any disagreements were addressed in the HPI. REVIEW OF SYSTEMS    (2-9 systems for level 4, 10 or more for level 5)     Review of Systems   Constitutional: Negative for activity change, chills and fever. Respiratory: Negative for chest tightness and shortness of breath. Cardiovascular: Negative for chest pain. Gastrointestinal: Negative for abdominal pain, diarrhea, nausea and vomiting. Genitourinary: Negative for dysuria and flank pain. Musculoskeletal: Positive for arthralgias. Skin: Positive for color change. Neurological: Negative for seizures, syncope, numbness and headaches. Positives and Pertinent negatives as per HPI. Except as noted above in the ROS, all other systems were reviewed and negative.        PAST MEDICAL HISTORY     Past Medical History:   Diagnosis Date    Abnormal Pap smear of cervix     in past, recent normal    Anemia     Cervix dysplasia     Depression     no meds currently    Kidney stones     kidney stones    Postpartum depression     Zoloft helped         SURGICAL HISTORY     Past Surgical History:   Procedure Laterality Date    CHOLECYSTECTOMY      LASIK      TONSILLECTOMY           CURRENTMEDICATIONS       Previous Medications    IBUPROFEN (ADVIL;MOTRIN) 800 MG TABLET    Take 1 tablet by mouth every 6 hours as needed for Pain    NONFORMULARY    1 tablet 3 times daily as needed (30 minutes before meals)    PRENATAL MULTIVIT-MIN-FE-FA (PRENATAL VITAMINS PO)    Take 1 tablet by mouth daily         ALLERGIES     Demerol; Monistat [tioconazole]; and Pineapple    FAMILYHISTORY       Family History   Problem Relation Age of Onset    Heart Failure Mother     Heart Attack Father     Diabetes Father     Cancer Father         Lung cancer    Arthritis Other     Asthma Other     Cancer Other     Diabetes Other     Heart Disease Other     High Blood Pressure Other     Kidney Disease Other           SOCIAL HISTORY       Social History     Tobacco Use    Smoking status: Never Smoker    Smokeless tobacco: Never Used   Substance Use Topics    Alcohol use: No    Drug use: No       SCREENINGS             PHYSICAL EXAM    (up to 7 for level 4, 8 or more for level 5)     ED Triage Vitals [10/09/20 1532]   BP Temp Temp Source Pulse Resp SpO2 Height Weight   121/85 97.4 °F (36.3 °C) Tympanic 73 12 98 % 5' 8\" (1.727 m) 225 lb (102.1 kg)       Physical Exam  Vitals signs and nursing note reviewed. Constitutional:       General: She is awake. She is not in acute distress. Appearance: Normal appearance. She is well-developed. She is not ill-appearing or diaphoretic. HENT:      Head: Normocephalic and atraumatic. Right Ear: External ear normal.      Left Ear: External ear normal.   Eyes:      General: No scleral icterus. Right eye: No discharge. Left eye: No discharge. Conjunctiva/sclera: Conjunctivae normal.   Neck:      Musculoskeletal: Normal range of motion. Vascular: No JVD. Cardiovascular:      Rate and Rhythm: Normal rate and regular rhythm. Heart sounds: No murmur. No friction rub. No gallop. Pulmonary:      Effort: Pulmonary effort is normal. No accessory muscle usage or respiratory distress. Breath sounds: Normal breath sounds. No wheezing, rhonchi or rales. Abdominal:      General: There is no distension. Palpations: Abdomen is soft. Abdomen is not rigid. There is no mass. Tenderness: There is no abdominal tenderness. There is no guarding or rebound. Musculoskeletal:      Left hand: She exhibits decreased range of motion, tenderness, bony tenderness and swelling. She exhibits normal capillary refill, no deformity and no laceration. Normal sensation noted. Normal strength noted. Hands:    Skin:     General: Skin is warm and dry. Neurological:      Mental Status: She is alert and oriented to person, place, and time. GCS: GCS eye subscore is 4. GCS verbal subscore is 5. GCS motor subscore is 6. Cranial Nerves: No cranial nerve deficit. Sensory: No sensory deficit. Coordination: Coordination normal.   Psychiatric:         Behavior: Behavior normal. Behavior is cooperative. DIAGNOSTIC RESULTS   LABS:    Labs Reviewed - No data to display    All other labs were within normal range or not returned as of this dictation. EKG: All EKG's are interpreted by the Emergency Department Physician in the absence of a cardiologist.  Please see their note for interpretation of EKG. RADIOLOGY:   Non-plain film images such as CT, Ultrasound and MRI are read by the radiologist. Plain radiographic images are visualized and preliminarily interpreted by the ED Provider with the below findings:      Interpretation per the Radiologist below, if available at the time of this note:    XR FINGER LEFT (MIN 2 VIEWS)   Final Result   Negative radiographs of the left 4th finger. Xr Finger Left (min 2 Views)    Result Date: 10/9/2020  EXAMINATION: 4 XRAY VIEWS OF THE LEFT FINGER 10/9/2020 4:42 pm COMPARISON: None. HISTORY: ORDERING SYSTEM PROVIDED HISTORY: pain TECHNOLOGIST PROVIDED HISTORY: Reason for exam:->pain Reason for Exam: pain Acuity: Unknown Type of Exam: Unknown FINDINGS: The bony structures, soft tissues and joints appear within normal limits throughout. No fracture demonstrated, and no dislocation. Joint spacing within normal limits. No significant degenerative change or other significant finding. No evidence of arthritis     Negative radiographs of the left 4th finger.            PROCEDURES   Unless otherwise noted below, none     Procedures    CRITICAL CARE TIME   N/A    CONSULTS:  None      EMERGENCY DEPARTMENT COURSE and DIFFERENTIAL DIAGNOSIS/MDM:   Vitals:    Vitals:    10/09/20 1532   BP: 121/85   Pulse: 73   Resp: 12   Temp: 97.4 °F (36.3 °C)   TempSrc: Tympanic   SpO2: 98%   Weight: 225 lb (102.1 kg)   Height: 5' 8\" (1.727 m)       Patient was given the following medications:  Medications - No data to display        The patient's detailed history of present illness is documented as above. Upon arrival to the emergency department the patient's vital signs are as documented. The patient is noted to be hemodynamically stable and afebrile. Physical examination findings are as above. Radiographs of the ring finger completed as above demonstrate no evidence of acute fracture or dislocation. Despite no reports of trauma with emerging ecchymosis and concerns for sprain I think there likely was. Should be placed in aluminum splint. Be placed on Naprosyn. She tells me she no longer is able to be seen by Dr. Errol Juan as she has had a change in her insurance. Primary care referral is been given. Medications for the home environment with strict potential instructions for return were discussed in detail. The patient has been made aware of the signs and symptoms which would necessitate an immediate return to the emergency department and verbalizes an understanding of these signs and symptoms. I estimate there is low risk for compartment syndrome, deep venous thrombosis, limb-threatening infectious, tendon and/or neurovascular injury and therefore I consider the discharge disposition reasonable. [address] [place] [address] and I have discussed the diagnosis and risks, and we agree with discharging home to follow-up with their primary care provider and/or the referring orthopedist on call. We also discussed returning to the emergency department immediately if new or worsening symptoms occur. We have discussed the symptoms which are most concerning (e.g., changing or worsening pain, numbness, weakness) that necessitate immediate return. FINAL IMPRESSION      1.  Sprain of left index finger, initial encounter          DISPOSITION/PLAN   DISPOSITION Decision To Discharge 10/09/2020 05:53:53 PM      PATIENT REFERREDTO:  [facility]) Pre-Services  [place] [address] Emergency [address] [facility]  [phone]    If symptoms worsen      DISCHARGE MEDICATIONS:  New Prescriptions    NAPROXEN (NAPROSYN) 500 MG TABLET    Take 1 tablet by mouth 2 times daily as needed for Pain       DISCONTINUED MEDICATIONS:  Discontinued Medications    No medications on file            (Please note that portions of this note were completed with a voice recognition program.  Efforts were made to edit the dictations but occasionally words are mis-transcribed.)    Abraham Kang PA-C (electronically signed)           Andrew Church PA-C  10/09/20 [id number]

## 2020-10-09 NOTE — ED NOTES
Bed: 07  Expected date:   Expected time:   Means of arrival:   Comments:  lisbeth Hobbs RN  10/09/20 5743

## 2020-10-30 ENCOUNTER — NURSE TRIAGE (OUTPATIENT)
Dept: OTHER | Facility: CLINIC | Age: 40
End: 2020-10-30

## 2020-10-30 NOTE — TELEPHONE ENCOUNTER
Finger left ring red and sore when woke last week in September. Went to ER on 10/9/2020 and it was warm to touch. X-ray and no fracture. Splinted. Still can't bend it, warm to touch, painful today. Pain 6-8/10 with bending. Reason for Disposition   MODERATE pain (e.g., interferes with normal activities) and present > 3 days    Answer Assessment - Initial Assessment Questions  1. ONSET: \"When did the pain start? \"         See above    2. LOCATION and RADIATION: \"Where is the pain located? \"  (e.g., fingertip, around nail, joint, entire   finger)         See above    3. SEVERITY: \"How bad is the pain? \" \"What does it keep you from doing? \"   (Scale 1-10; or mild, moderate, severe)   - MILD - doesn't interfere with normal activities    - MODERATE - interferes with normal activities or awakens from sleep   - SEVERE - excruciating pain, unable to hold a glass of water or bend finger even a little        Moderate    4. APPEARANCE: \"What does the finger look like? \" (e.g., redness, swelling, bruising, pallor)        See above    5. WORK OR EXERCISE: \"Has there been any recent work or exercise that involved this part of the body? \"        No    6. CAUSE: \"What do you think is causing the pain? \"        Unsure    7. AGGRAVATING FACTORS: \"What makes the pain worse? \" (e.g., using computer)        Bending, working    8. OTHER SYMPTOMS: \"Do you have any other symptoms? \" (e.g., fever, neck pain, numbness)        No    9. PREGNANCY: \"Is there any chance you are pregnant? \" \"When was your last menstrual period? \"        No    Protocols used: FINGER PAIN-ADULT-[place]    Caller provided care advice and instructed to call back with worsening symptoms. Warm transfer Shira At [facility] ([facility]). Attention Provider: Thank you for allowing me to participate in the care of your patient. The patient was connected to triage in response to information provided to the [facility].   Please do not respond through this encounter as the response is not directed to a shared pool.

## 2020-11-02 ENCOUNTER — OFFICE VISIT (OUTPATIENT)
Dept: FAMILY MEDICINE CLINIC | Age: 40
End: 2020-11-02
Payer: MEDICARE

## 2020-11-02 VITALS
HEART RATE: 59 BPM | BODY MASS INDEX: 37.34 KG/M2 | OXYGEN SATURATION: 98 % | DIASTOLIC BLOOD PRESSURE: 86 MMHG | SYSTOLIC BLOOD PRESSURE: 124 MMHG | WEIGHT: 245.6 LBS

## 2020-11-02 DIAGNOSIS — M25.50 ARTHRALGIA, UNSPECIFIED JOINT: ICD-10-CM

## 2020-11-02 LAB
A/G RATIO: 1.4 (ref 1.1–2.2)
ALBUMIN SERPL-MCNC: 4 G/DL (ref 3.4–5)
ALP BLD-CCNC: 75 U/L (ref 40–129)
ALT SERPL-CCNC: 16 U/L (ref 10–40)
ANION GAP SERPL CALCULATED.3IONS-SCNC: 14 MMOL/L (ref 3–16)
AST SERPL-CCNC: 15 U/L (ref 15–37)
BASOPHILS ABSOLUTE: 0 K/UL (ref 0–0.2)
BASOPHILS RELATIVE PERCENT: 0.6 %
BILIRUB SERPL-MCNC: <0.2 MG/DL (ref 0–1)
BUN BLDV-MCNC: 6 MG/DL (ref 7–20)
C-REACTIVE PROTEIN: 4.7 MG/L (ref 0–5.1)
CALCIUM SERPL-MCNC: 9 MG/DL (ref 8.3–10.6)
CHLORIDE BLD-SCNC: 102 MMOL/L (ref 99–110)
CO2: 25 MMOL/L (ref 21–32)
CREAT SERPL-MCNC: 0.7 MG/DL (ref 0.6–1.1)
EOSINOPHILS ABSOLUTE: 0.1 K/UL (ref 0–0.6)
EOSINOPHILS RELATIVE PERCENT: 1 %
GFR AFRICAN AMERICAN: >60
GFR NON-AFRICAN AMERICAN: >60
GLOBULIN: 2.9 G/DL
GLUCOSE BLD-MCNC: 85 MG/DL (ref 70–99)
HCT VFR BLD CALC: 36.6 % (ref 36–48)
HEMOGLOBIN: 11.9 G/DL (ref 12–16)
LYMPHOCYTES ABSOLUTE: 2.8 K/UL (ref 1–5.1)
LYMPHOCYTES RELATIVE PERCENT: 37.5 %
MCH RBC QN AUTO: 25.6 PG (ref 26–34)
MCHC RBC AUTO-ENTMCNC: 32.4 G/DL (ref 31–36)
MCV RBC AUTO: 79.1 FL (ref 80–100)
MONOCYTES ABSOLUTE: 0.4 K/UL (ref 0–1.3)
MONOCYTES RELATIVE PERCENT: 5.6 %
NEUTROPHILS ABSOLUTE: 4.2 K/UL (ref 1.7–7.7)
NEUTROPHILS RELATIVE PERCENT: 55.3 %
PDW BLD-RTO: 15.3 % (ref 12.4–15.4)
PLATELET # BLD: 330 K/UL (ref 135–450)
PMV BLD AUTO: 8.3 FL (ref 5–10.5)
POTASSIUM SERPL-SCNC: 4 MMOL/L (ref 3.5–5.1)
RBC # BLD: 4.63 M/UL (ref 4–5.2)
RHEUMATOID FACTOR: <10 IU/ML
SEDIMENTATION RATE, ERYTHROCYTE: 23 MM/HR (ref 0–20)
SODIUM BLD-SCNC: 141 MMOL/L (ref 136–145)
TOTAL PROTEIN: 6.9 G/DL (ref 6.4–8.2)
WBC # BLD: 7.6 K/UL (ref 4–11)

## 2020-11-02 PROCEDURE — 1036F TOBACCO NON-USER: CPT | Performed by: NURSE PRACTITIONER

## 2020-11-02 PROCEDURE — 99213 OFFICE O/P EST LOW 20 MIN: CPT | Performed by: NURSE PRACTITIONER

## 2020-11-02 PROCEDURE — G8484 FLU IMMUNIZE NO ADMIN: HCPCS | Performed by: NURSE PRACTITIONER

## 2020-11-02 PROCEDURE — G8417 CALC BMI ABV UP PARAM F/U: HCPCS | Performed by: NURSE PRACTITIONER

## 2020-11-02 PROCEDURE — G8427 DOCREV CUR MEDS BY ELIG CLIN: HCPCS | Performed by: NURSE PRACTITIONER

## 2020-11-02 RX ORDER — PREDNISONE 20 MG/1
TABLET ORAL
Qty: 20 TABLET | Refills: 0 | Status: SHIPPED | OUTPATIENT
Start: 2020-11-02 | End: 2020-12-02

## 2020-11-02 ASSESSMENT — PATIENT HEALTH QUESTIONNAIRE - PHQ9
SUM OF ALL RESPONSES TO PHQ9 QUESTIONS 1 & 2: 0
SUM OF ALL RESPONSES TO PHQ QUESTIONS 1-9: 0
SUM OF ALL RESPONSES TO PHQ QUESTIONS 1-9: 0
2. FEELING DOWN, DEPRESSED OR HOPELESS: 0
SUM OF ALL RESPONSES TO PHQ QUESTIONS 1-9: 0
1. LITTLE INTEREST OR PLEASURE IN DOING THINGS: 0

## 2020-11-02 NOTE — PATIENT INSTRUCTIONS
Patient Education        Joint Pain: Care Instructions  Your Care Instructions     Many people have small aches and pains from overuse or injury to muscles and joints. Joint injuries often happen during sports or recreation, work tasks, or projects around the home. An overuse injury can happen when you put too much stress on a joint or when you do an activity that stresses the joint over and over, such as using the computer or rowing a boat. You can take action at home to help your muscles and joints get better. You should feel better in 1 to 2 weeks, but it can take 3 months or more to heal completely. Follow-up care is a key part of your treatment and safety. Be sure to make and go to all appointments, and call your doctor if you are having problems. It's also a good idea to know your test results and keep a list of the medicines you take. How can you care for yourself at home? · Do not put weight on the injured joint for at least a day or two. · For the first day or two after an injury, do not take hot showers or baths, and do not use hot packs. The heat could make swelling worse. · Put ice or a cold pack on the sore joint for 10 to 20 minutes at a time. Try to do this every 1 to 2 hours for the next 3 days (when you are awake) or until the swelling goes down. Put a thin cloth between the ice and your skin. · Wrap the injury in an elastic bandage. Do not wrap it too tightly because this can cause more swelling. · Prop up the sore joint on a pillow when you ice it or anytime you sit or lie down during the next 3 days. Try to keep it above the level of your heart. This will help reduce swelling. · Take an over-the-counter pain medicine, such as acetaminophen (Tylenol), ibuprofen (Advil, Motrin), or naproxen (Aleve). Read and follow all instructions on the label. · After 1 or 2 days of rest, begin moving the joint gently.  While the joint is still healing, you can begin to exercise using activities that do not strain or hurt the painful joint. When should you call for help? Call your doctor now or seek immediate medical care if:    · You have signs of infection, such as:  ? Increased pain, swelling, warmth, and redness. ? Red streaks leading from the joint. ? A fever. Watch closely for changes in your health, and be sure to contact your doctor if:    · Your movement or symptoms are not getting better after 1 to 2 weeks of home treatment. Where can you learn more? Go to [email or web address]://[email or web address]. org and sign in to your whoplusyou account. Enter P205 in the [place] box to learn more about \"Joint Pain: Care Instructions. \"     If you do not have an account, please click on the \"Sign Up Now\" link. Current as of: March 2, 2020               Content Version: 12.6  © 8115-9074 [employer]. Care instructions adapted under license by [facility] ([facility]). If you have questions about a medical condition or this instruction, always ask your healthcare professional. [address] any warranty or liability for your use of this information.

## 2020-11-02 NOTE — PROGRESS NOTES
(Hand, Wrist, Upper Extremity), [facility]  Rest, heat and elevate    Arthralgia, unspecified joint  -     Rheumatoid Factor; Future  -     C-Reactive Protein; Future  -     KANU; Future  -     Sedimentation Rate; Future  -     CBC Auto Differential; Future  -     Comprehensive Metabolic Panel; Future  -     predniSONE (DELTASONE) 20 MG tablet; 4 po daily for 2 days, 3 for 2 days, 2 for 2 days, 1 for 2 days, then stop  See pt instructions  F/u prn  Discussed use, benefit, and side effects of prescribed medications. All patient questions answered. Pt voiced understanding.

## 2020-11-03 LAB — ANTI-NUCLEAR ANTIBODY (ANA): NEGATIVE

## 2020-11-11 ENCOUNTER — OFFICE VISIT (OUTPATIENT)
Dept: ORTHOPEDIC SURGERY | Age: 40
End: 2020-11-11
Payer: MEDICARE

## 2020-11-11 VITALS — BODY MASS INDEX: 37.13 KG/M2 | HEIGHT: 68 IN | WEIGHT: 245 LBS | RESPIRATION RATE: 16 BRPM | TEMPERATURE: 97.9 F

## 2020-11-11 PROBLEM — M65.342 TRIGGER RING FINGER OF LEFT HAND: Status: ACTIVE | Noted: 2020-11-11

## 2020-11-11 PROCEDURE — 1036F TOBACCO NON-USER: CPT | Performed by: PHYSICIAN ASSISTANT

## 2020-11-11 PROCEDURE — 99203 OFFICE O/P NEW LOW 30 MIN: CPT | Performed by: PHYSICIAN ASSISTANT

## 2020-11-11 PROCEDURE — G8417 CALC BMI ABV UP PARAM F/U: HCPCS | Performed by: PHYSICIAN ASSISTANT

## 2020-11-11 PROCEDURE — G8428 CUR MEDS NOT DOCUMENT: HCPCS | Performed by: PHYSICIAN ASSISTANT

## 2020-11-11 PROCEDURE — G8484 FLU IMMUNIZE NO ADMIN: HCPCS | Performed by: PHYSICIAN ASSISTANT

## 2020-11-11 NOTE — PROGRESS NOTES
Ms. Stefano Catalan is a 36 y.o. right handed man  who is seen today in Hand Surgical Consultation at the request of Vic Davis MD.    She presents today regarding left symptoms which have been present for approximately 2 months. A history of antecedent trauma or injury is Absent. She reports symptoms to include moderate pain and stiffness with rare popping, catching or locking of the left Ring Finger. Finger symptoms are Seldom worse in the morning or overnight. She reports moderate pain located at the base of the symptomatic finger(s). Symptoms are worsening over time. Previous treatment has included conservative measures. She does not claim relation of her symptoms to her required work activities. She has not undergone any form of testing. I have today reviewed with Stefano Catalan the clinically relevant, past medical history, medications, allergies,  family history, social history, and Review Of Systems & I have documented any details relevant to today's presenting complaints in my history above. Ms. Mo Cardoza self-reported past medical history, medications, allergies,  family history, social history, and Review Of Systems have been scanned into the chart under the \"Media\" tab. Physical Exam:  Ms. Mo Carodza most recent vitals:  Vitals  Temp: 97.9 °F (36.6 °C)  Temp Source: Infrared  Resp: 16  Height: 5' 8\" (172.7 cm)  Weight: 245 lb (111.1 kg)    She is well nourished, oriented to person, place & time. She demonstrates appropriate mood and affect as well as normal gait and station. Skin: Normal in appearance, Normal Color and Free of Lesions Bilaterally   Digital range of motion is limited by pain on the Left, normal on the Right. Inducible triggering is noted upon flexion in the left Ring Finger. There is not an associated flexion contracture of the PIP joint. No other digit demonstrates evidence for stenosing tenosynovitis bilaterally.   Wrist range of motion is Full and equal was given the opportunity to ask questions, voiced an understanding of the procedure, and she did wish to proceed with Left Ring Finger Trigger Finger Release (A1 Pulley Release). I had an extensive discussion with Ms. Neda Burk (and any family members present with her today) regarding the natural history, etiology, and long term consequences of this problem. We have mutually selected a surgical treatment plan  and, in my opinion, surgical intervention is indicated at this time. I have discussed with her the potential complications, limitations, expectations, alternatives, and risks of Trigger Finger Release. She has had full opportunity to ask her questions. I have answered them all to her satisfaction. I feel that Ms. Neda Burk (and any family members present with her today) do understand our discussion today and she has provided informed consent for Left Ring Finger Trigger Finger Release (A1 Pulley Release). I have also discussed with Ms. Neda Burk the other treatment options available to her for this condition. We have today selected to proceed with Surgical treatment. She has voiced and  understanding that there are other less aggressive treatment options which are available in this situation, albeit possibly less efficacious or durable, and she is comfortable with the plan that she has chosen. Ms. Neda Burk has been given a full verbal list of instructions and precautions related to her present condition. I have asked her to followup with me in the office at the prescribed time. She is also specifically requested to call or return to the office sooner if her symptoms change or worsen prior to the next scheduled appointment.

## 2020-11-11 NOTE — LETTER
[id number] [facility] - HAND SURGERY  Surgery Scheduling Form:      DEMOGRAPHICS:                                                                                                              .  Patient Name:  Babar Vigil  Patient :  1980   Patient SS#:  [id number]    Patient Phone:  [phone] (home)      Patient Address:  [address] [place] [address]    PCP:  Silvana Meza MD  Insurance:   Payor/Plan Subscr  Sex Relation Sub. Ins. ID Effective Group Num   1. PARAMOUNT ADVSven Noun 1980 Female  [id number] 1/1/15 [id number]                                   [address]       DIAGNOSIS & PROCEDURE:                                                                                            .  Diagnosis:   left Ring Finger Trigger Finger (727.03)    M65.342  Operation:  left Ring Finger Trigger Finger Release  [CPT: 30443]  Location:  [facility] ORTHOPEDIC AND SPINE [facility] AT [place]  Surgeon:  Delbra Aase    SCHEDULING INFORMATION:                                                                                         .    Surgeon's Scheduling Instruction:  elective    RN Post-op Appt:  [x] Yes   [] No  Preferred Thursday:   [] Yes   [] No    Requested Date:    OR Time:  9:45 Patient Arrival Time: 8:15     OR Time Required:  10  Minutes  Anesthesia:  MAC/TIVA  Equipment:  None                                 COVID   12-2  Mini C-Arm:  No   Standard C-Arm:  No  Status:  outpatient  PAT Required:  Yes  Comments:                      Quin Vu. Pancho Shields MD  20 2:04 PM    BILLING INFORMATION:                                                                                                    .    Procedure:       CPT Code Modifier  left Ring Finger Trigger Finger Release      .  91414          Pre Operative Physician Prophylaxis Orders - SCIP Protocols      Pre-Operative Antibiotic Order:    Allergies   Allergen Reactions    Demerol      UNKNOWN    Monistat [Tioconazole]     Pineapple Itching [x]  ----  No Antibiotic Ordered       []  ----  Give the following Antibiotic within 1 hour prior to start time:         Ancef 1 gram IV if patient is less than 200 pounds    or       Ancef 2 grams IV if patient is greater than 200 pounds    or      Vancomycin 1 gram IV (over 1 hour) if patient is allergic to           PENICILLINS or CEFALOSPORINS         SURG     12-8                             COVID      12-2                          H&P      PCP   __xx__       Procedure: left Ring Finger Trigger Finger Release     Patient: Ulises Benjamin  :    1980    Physician Signature:     Date: 20  Time: 2:04 PM

## 2020-11-11 NOTE — PATIENT INSTRUCTIONS
Pre-Operative Instructions    1. The night before your surgery, unless otherwise instructed, do not eat any food, drink any liquids, chew gum or mints after midnight. Abstain from alcohol for 24 hours prior to surgery. 2. You will be contacted by the Hospital the working day prior to your procedure to confirm your arrival time. 3. Patients under 25years of age must have a parent or legal guardian present to sign their consent and discharge paperwork. 4. On the day of surgery,  you will be seen pre-operatively by an anesthesiologist.     5. If you are having hand surgery, it is recommended that nail polish and acrylic nails be removed prior to surgery if possible. 6. Please bring cases for glasses, contact lenses, hearing aids or dentures. They will likely be removed prior to surgery. 7. Wear casual, loose-fitting and comfortable clothing. Consider that you may have a large dressing to fit under your clothing after surgery. 9. Please do not bring valuables such as jewelry or large sums of cash to the hospital. Remove all body piercings before coming to the hospital. Arsen Mays may not  wear any rings on the hand if you are having surgery on that hand, wrist or elbow. 10. Do not smoke or chew tobacco before your surgery. [address] [place] [address] and surgery facilities are smoke-free environments. Smoking is not permitted anywhere on campus. 11. Be sure to follow any additional instructions from your physician. If the above conditions are not met, your surgery may be cancelled and rescheduled for another day. Should you develop any change in your health such as fever, cough, sore throat, cold, flu, or infection, or if you have any questions regarding your Pre-admission or surgery, please contact [address] - Surgery Scheduling at [phone], Monday through Friday, 9 a.m. to 5 p.m.

## 2020-11-11 NOTE — LETTER
CONSENT TO OPERATION  AND/OR OTHER PROCEDURE(S)          PATIENT : Vero Porter   YOB: 1980      DATE : 11/11/20          1. I request and consent that Dr. Quin Vu. Pancho Shields,  and/or his associates or assistants perform an operation and/or procedures on the above patient at  [place] [address], to treat the condition(s) which appear indicated by the diagnostic studies already performed. I have been told that in general terms the nature, purpose and reasonable expectations of the operation and/or procedure(s) are:           Left Ring Finger Trigger Finger Release       2. It has been explained to me by the informing physician that during the course of the operation and/or procedure(s) unforeseen conditions may be revealed that necessitate an extension of the original operation and/or procedure(s) or different operation and/or procedures than those set forth in Paragraph 1. I therefore authorize and request that my physician and/or his associates or assistants perform such operations and/or procedures as are necessary and desirable in the exercise of professional judgment. The authority granted under this Paragraph 2 shall extend to all conditions that require treatment and are known to my physician at the time the operation is commenced. 3. I have been made aware by the informing physician of certain risks and consequences that are associated with the operation and/or procedure(s) described in Paragraph 1, the reasonable alternative methods or treatment, the possible consequences, the possibility that the operation and/or procedure(s) may be unsuccessful and the possibility of complications.   I understand the reasonably known risks to be:      - Bleeding  - Infection  - Poor Healing  - Possible Damage to Nerve, Vessel, Tendon/Muscle or Bone  - Need for further Treatment/Surgery  - Stiffness  - Pain  - Residual or Recurrent Symptoms  - Anesthetic and/or Medical Risks  - We have discussed the specific limitations and risks of hospital and/or office based treatment at this time due to the COVID-19 pandemic                I have been counseled about the risks of deedee Covid-19 in the saira-operative and post-operative periods related to this procedure. I have been made aware that deedee Covid-19 around the time of a surgical procedure may worsen my prognosis for recovering from the virus and lend to a higher morbidity and or mortality risk. With this knowledge, I have requested to proceed with the procedure as scheduled. 4. I have also been informed by the informing physician that there are other risks from both known and unknown causes that are attendant to the performance of any surgical procedure. I am aware that the practice of medicine and surgery is not an exact science, and that no guarantees have been made to me concerning the results of the operation and/or procedure(s). 5. I   CONSENT / REFUSE CONSENT  (strike the phrase that does not apply) to the taking of photographs before, during and/or after the operation or procedure for scientific/educational purposes. 6. I consent to the administration of anesthesia and to the use of such anesthetics as may be deemed advisable by the anesthesiologist who has been engaged by me or my physician.     7. I certify that I have read and understand the above consent to operation and/or other procedure(s); that the explanations therein referred to were made to me by the informing physician in advance of my signing this consent; that all blanks or statements requiring insertion or completion were filled in and inapplicable paragraphs, if any, were stricken before I signed; and that all questions asked by me about the operation and/or procedure(s) which I have consented to have been fully answered in a satisfactory manner.                                 _______________________           11/11/20 forms (BWC, FMLA, etc.) will be filled out, upon request, to indicate your date of surgery and your restrictions as stated above. Dr. Hernando Smith' Narcotic Policy  Patients will only be prescribed narcotics after surgical procedures or significant injury. Not all procedures cause pain great enough to require Narcotics and thus, not all patients will receive prescriptions after surgical procedures or injuries. Narcotics are never prescribed for chronic conditions. Narcotics are never prescribed for use longer than one week at a time. Refills are only granted in unusual circumstances and only at Dr. Hortensia Munguia discretion. Patients who are receiving narcotic medication from another physician or who are under pain management contracts will not be given a prescription for narcotics for any reason. I have read the above policies and understand that by agreeing to proceed with treatment by Dr. Alex Wallace and his team, that I am agreeing to abide by these policies.   Patient Name:  Darcy Villeda    Patient Signature:  _____________________________    Nadia Quivers Date:   11/11/20

## 2020-11-17 ENCOUNTER — TELEPHONE (OUTPATIENT)
Dept: ORTHOPEDIC SURGERY | Age: 40
End: 2020-11-17

## 2020-12-02 ENCOUNTER — OFFICE VISIT (OUTPATIENT)
Dept: PRIMARY CARE CLINIC | Age: 40
End: 2020-12-02
Payer: MEDICARE

## 2020-12-02 ENCOUNTER — OFFICE VISIT (OUTPATIENT)
Dept: FAMILY MEDICINE CLINIC | Age: 40
End: 2020-12-02
Payer: MEDICARE

## 2020-12-02 VITALS
SYSTOLIC BLOOD PRESSURE: 122 MMHG | HEIGHT: 68 IN | OXYGEN SATURATION: 99 % | WEIGHT: 248.4 LBS | HEART RATE: 69 BPM | TEMPERATURE: 98.2 F | DIASTOLIC BLOOD PRESSURE: 76 MMHG | BODY MASS INDEX: 37.65 KG/M2

## 2020-12-02 PROCEDURE — 90686 IIV4 VACC NO PRSV 0.5 ML IM: CPT | Performed by: FAMILY MEDICINE

## 2020-12-02 PROCEDURE — G8428 CUR MEDS NOT DOCUMENT: HCPCS | Performed by: NURSE PRACTITIONER

## 2020-12-02 PROCEDURE — G8427 DOCREV CUR MEDS BY ELIG CLIN: HCPCS | Performed by: FAMILY MEDICINE

## 2020-12-02 PROCEDURE — G8417 CALC BMI ABV UP PARAM F/U: HCPCS | Performed by: FAMILY MEDICINE

## 2020-12-02 PROCEDURE — G8417 CALC BMI ABV UP PARAM F/U: HCPCS | Performed by: NURSE PRACTITIONER

## 2020-12-02 PROCEDURE — G8482 FLU IMMUNIZE ORDER/ADMIN: HCPCS | Performed by: FAMILY MEDICINE

## 2020-12-02 PROCEDURE — 99243 OFF/OP CNSLTJ NEW/EST LOW 30: CPT | Performed by: FAMILY MEDICINE

## 2020-12-02 PROCEDURE — 90471 IMMUNIZATION ADMIN: CPT | Performed by: FAMILY MEDICINE

## 2020-12-02 PROCEDURE — 99211 OFF/OP EST MAY X REQ PHY/QHP: CPT | Performed by: NURSE PRACTITIONER

## 2020-12-02 NOTE — PROGRESS NOTES
[address] Note    Date: 12/2/2020                                               Subjective/Objective:     Chief Complaint   Patient presents with    Pre-op Exam     left ring finger surgery on 12/08       HPI   Patient is here for preop exam.  Is having surgery on her L4 finger on 12/8/2020. Patient can walk a good distance without chest pain or shortness of breath. Can climb stairs do moderate housework. Denies any personal or family history of blood clots or reactions to anesthesia. Patient Active Problem List    Diagnosis Date Noted    Trigger ring finger of left hand 11/11/2020    Poor fetal growth affecting management of mother in third trimester 12/07/2018    Normal labor 12/07/2018    Poor fetal growth affecting management of mother in third trimester 12/07/2018    Normal vaginal delivery 12/07/2018    Vaginal delivery 12/07/2018    Threatened labor 11/16/2018    Chest pain 09/18/2015    Injury of ankle, right 03/28/2014    Fracture, fibula 03/28/2014       Past Medical History:   Diagnosis Date    Abnormal Pap smear of cervix     in past, recent normal    Anemia     Cervix dysplasia     Depression     no meds currently    Kidney stones     kidney stones    Postpartum depression     Zoloft helped       No current outpatient medications on file. No current facility-administered medications for this visit. Allergies   Allergen Reactions    Demerol      UNKNOWN    Monistat [Tioconazole]     Pineapple Itching       Review of Systems   No fever, no cough, no vomiting, no dysuria, no headache, no seizure, no ankle swelling, no rash, no bruise, no LAD      Vitals:  /76   Pulse 69   Temp 98.2 °F (36.8 °C)   Ht 5' 8\" (1.727 m)   Wt 248 lb 6.4 oz (112.7 kg)   SpO2 99%   BMI 37.77 kg/m²     Physical Exam   General:  Well-appearing, NAD, alert, non-toxic  HEENT:  Normocephalic, atraumatic. Pupils equal and round.  TMs pearly with good landmarks. Moist mucous membranes. Normal dentition  NECK:  Supple, normal range of motion, no LAD, no meningeal signs, no JVD, nontender  CHEST/LUNGS: CTAB, no crackles, no wheeze, no rhonchi. Symmetric rise  CARDIOVASCULAR: RRR,  no murmur, no rub  ABDOMEN: Soft, non-tender, non-distended. No masses  EXTREMETIES: Normal movement of all extremities. No edema. No joint swelling. SKIN:  No rash, no cellulitis, no bruising, no petechiae/purpura/vesicles/pustules/abscess  PSYCH:  A+O x 3; normal affect  NEURO:  GCS 15, CN2-12 grossly intact, no focal motor/sensory deficits, no cerebellar deficits, normal gait, normal speech      Assessment/Plan     44yo female here for pre-op exam. VSS. No sign of heart failure. No sign of active infection. She is cleared for the procedure. Discharged in stable condition at 12:05    1. Pre-op exam         No orders of the defined types were placed in this encounter. No follow-ups on file.     Leo Gomez MD    12/2/2020  11:54 AM

## 2020-12-02 NOTE — PATIENT INSTRUCTIONS

## 2020-12-02 NOTE — PROGRESS NOTES
Vero Porter received a viral test for COVID-19. They were educated on isolation and quarantine as appropriate. For any symptoms, they were directed to seek care from their PCP, given contact information to establish with a doctor, directed to an urgent care or the emergency room.

## 2020-12-04 LAB — SARS-COV-2, NAA: NOT DETECTED

## 2020-12-04 NOTE — PROGRESS NOTES
[address] [facility] time_0740___________        Surgery time_0940___________    Take the following medications with a sip of water:    Do not eat or drink anything after 12:00 midnight prior to your surgery. This includes water chewing gum, mints and ice chips. You may brush your teeth and gargle the morning of your surgery, but do not swallow the water     Please see your family doctor/pediatrician for a history and physical and/or concerning medications. Bring any test results/reports from your physicians office. If you are under the care of a heart doctor or specialist doctor, please be aware that you may be asked to them for clearance    You may be asked to stop blood thinners such as Coumadin, Plavix, Fragmin, Lovenox, etc., or any anti-inflammatories such as:  Aspirin, Ibuprofen, Advil, Naproxen prior to your surgery. We also ask that you stop any OTC medications such as fish oil, vitamin E, glucosamine, garlic, Multivitamins, COQ 10, etc.    We ask that you do not smoke 24 hours prior to surgery  We ask that you do not  drink any alcoholic beverages 24 hours prior to surgery     You must make arrangements for a responsible adult to take you home after your surgery. For your safety you will not be allowed to leave alone or drive yourself home. Your surgery will be cancelled if you do not have a ride home. Remember. Michael Phillip Safety First! Call before you Fall    Also for your safety, it is strongly suggested that someone stay with you the first 24 hours after your surgery. A parent or legal guardian must accompany a child scheduled for surgery and plan to stay at the hospital until the child is discharged. Please do not bring other children with you. For your comfort, please wear simple loose fitting clothing to the hospital.  Please do not bring valuables.     Do not wear any make-up or nail polish on your fingers or toes      For your safety, please do not wear any jewelry or body piercing's on the day of surgery. All jewelry must be removed. If you have dentures, they will be removed before going to operating room. For your convenience, we will provide you with a container. If you wear contact lenses or glasses, they will be removed, please bring a case for them. If you have a living will and a durable power of  for healthcare, please bring in a copy. As part of our patient safety program to minimize surgical site infections, we ask you to do the following:    · Please notify your surgeon if you develop any illness between         now and the  day of your surgery. · This includes a cough, cold, fever, sore throat, nausea,         or vomiting, and diarrhea, etc.  ·  Please notify your surgeon if you experience dizziness, shortness         of breath or blurred vision between now and the time of your surgery. Do not shave your operative site 96 hours prior to surgery. For face and neck surgery, men may use an electric razor 48 hours   prior to surgery. You may shower the night before surgery or the morning of   your surgery with an antibacterial soap. You will need to bring a photo ID and insurance card    [facility] has an onsite pharmacy, would you like to utilize our pharmacy     If you will be staying overnight and use a C-pap machine, please bring   your C-pap to hospital     Our goal is to provide you with excellent care, therefore, visitors will be limited to two(2) in the room at a time so that we may focus on providing this care for you. Please contact pre-admission testing if you have any further questions. [facility] phone number:  [phone] PAT fax number:  [phone]  Please note these are generalized instructions for all surgical cases, you may be provided with more specific instructions according to your surgery.

## 2020-12-07 ENCOUNTER — ANESTHESIA EVENT (OUTPATIENT)
Dept: OPERATING ROOM | Age: 40
End: 2020-12-07
Payer: MEDICARE

## 2020-12-08 ENCOUNTER — HOSPITAL ENCOUNTER (OUTPATIENT)
Age: 40
Setting detail: OUTPATIENT SURGERY
Discharge: HOME | End: 2020-12-08
Attending: ORTHOPAEDIC SURGERY | Admitting: ORTHOPAEDIC SURGERY
Payer: MEDICARE

## 2020-12-08 ENCOUNTER — ANESTHESIA (OUTPATIENT)
Dept: OPERATING ROOM | Age: 40
End: 2020-12-08
Payer: MEDICARE

## 2020-12-08 VITALS
DIASTOLIC BLOOD PRESSURE: 66 MMHG | RESPIRATION RATE: 24 BRPM | SYSTOLIC BLOOD PRESSURE: 114 MMHG | OXYGEN SATURATION: 98 %

## 2020-12-08 VITALS
HEART RATE: 55 BPM | OXYGEN SATURATION: 100 % | RESPIRATION RATE: 12 BRPM | WEIGHT: 248.24 LBS | DIASTOLIC BLOOD PRESSURE: 85 MMHG | BODY MASS INDEX: 37.62 KG/M2 | SYSTOLIC BLOOD PRESSURE: 122 MMHG | HEIGHT: 68 IN | TEMPERATURE: 97.6 F

## 2020-12-08 LAB — PREGNANCY, URINE: NEGATIVE

## 2020-12-08 PROCEDURE — 2709999900 HC NON-CHARGEABLE SUPPLY: Performed by: ORTHOPAEDIC SURGERY

## 2020-12-08 PROCEDURE — 7100000011 HC PHASE II RECOVERY - ADDTL 15 MIN: Performed by: ORTHOPAEDIC SURGERY

## 2020-12-08 PROCEDURE — 7100000010 HC PHASE II RECOVERY - FIRST 15 MIN: Performed by: ORTHOPAEDIC SURGERY

## 2020-12-08 PROCEDURE — 6360000002 HC RX W HCPCS: Performed by: NURSE ANESTHETIST, CERTIFIED REGISTERED

## 2020-12-08 PROCEDURE — 3600000005 HC SURGERY LEVEL 5 BASE: Performed by: ORTHOPAEDIC SURGERY

## 2020-12-08 PROCEDURE — 7100000000 HC PACU RECOVERY - FIRST 15 MIN: Performed by: ORTHOPAEDIC SURGERY

## 2020-12-08 PROCEDURE — 7100000001 HC PACU RECOVERY - ADDTL 15 MIN: Performed by: ORTHOPAEDIC SURGERY

## 2020-12-08 PROCEDURE — 3700000000 HC ANESTHESIA ATTENDED CARE: Performed by: ORTHOPAEDIC SURGERY

## 2020-12-08 PROCEDURE — 2500000003 HC RX 250 WO HCPCS: Performed by: NURSE ANESTHETIST, CERTIFIED REGISTERED

## 2020-12-08 PROCEDURE — 3600000015 HC SURGERY LEVEL 5 ADDTL 15MIN: Performed by: ORTHOPAEDIC SURGERY

## 2020-12-08 PROCEDURE — 3700000001 HC ADD 15 MINUTES (ANESTHESIA): Performed by: ORTHOPAEDIC SURGERY

## 2020-12-08 PROCEDURE — 2500000003 HC RX 250 WO HCPCS: Performed by: ORTHOPAEDIC SURGERY

## 2020-12-08 PROCEDURE — 84703 CHORIONIC GONADOTROPIN ASSAY: CPT

## 2020-12-08 PROCEDURE — 2580000003 HC RX 258: Performed by: ANESTHESIOLOGY

## 2020-12-08 RX ORDER — SODIUM CHLORIDE 0.9 % (FLUSH) 0.9 %
10 SYRINGE (ML) INJECTION PRN
Status: DISCONTINUED | OUTPATIENT
Start: 2020-12-08 | End: 2020-12-08 | Stop reason: HOSPADM

## 2020-12-08 RX ORDER — LABETALOL HYDROCHLORIDE 5 MG/ML
5 INJECTION, SOLUTION INTRAVENOUS EVERY 10 MIN PRN
Status: DISCONTINUED | OUTPATIENT
Start: 2020-12-08 | End: 2020-12-08 | Stop reason: HOSPADM

## 2020-12-08 RX ORDER — MORPHINE SULFATE 2 MG/ML
2 INJECTION, SOLUTION INTRAMUSCULAR; INTRAVENOUS EVERY 5 MIN PRN
Status: DISCONTINUED | OUTPATIENT
Start: 2020-12-08 | End: 2020-12-08 | Stop reason: HOSPADM

## 2020-12-08 RX ORDER — OXYCODONE HYDROCHLORIDE AND ACETAMINOPHEN 5; 325 MG/1; MG/1
1 TABLET ORAL PRN
Status: DISCONTINUED | OUTPATIENT
Start: 2020-12-08 | End: 2020-12-08 | Stop reason: HOSPADM

## 2020-12-08 RX ORDER — MORPHINE SULFATE 2 MG/ML
1 INJECTION, SOLUTION INTRAMUSCULAR; INTRAVENOUS EVERY 5 MIN PRN
Status: DISCONTINUED | OUTPATIENT
Start: 2020-12-08 | End: 2020-12-08 | Stop reason: HOSPADM

## 2020-12-08 RX ORDER — OXYCODONE HYDROCHLORIDE AND ACETAMINOPHEN 5; 325 MG/1; MG/1
2 TABLET ORAL PRN
Status: DISCONTINUED | OUTPATIENT
Start: 2020-12-08 | End: 2020-12-08 | Stop reason: HOSPADM

## 2020-12-08 RX ORDER — SODIUM CHLORIDE 9 MG/ML
INJECTION, SOLUTION INTRAVENOUS CONTINUOUS
Status: DISCONTINUED | OUTPATIENT
Start: 2020-12-08 | End: 2020-12-08 | Stop reason: HOSPADM

## 2020-12-08 RX ORDER — PROPOFOL 10 MG/ML
INJECTION, EMULSION INTRAVENOUS CONTINUOUS PRN
Status: DISCONTINUED | OUTPATIENT
Start: 2020-12-08 | End: 2020-12-08 | Stop reason: SDUPTHER

## 2020-12-08 RX ORDER — MIDAZOLAM HYDROCHLORIDE 1 MG/ML
INJECTION INTRAMUSCULAR; INTRAVENOUS PRN
Status: DISCONTINUED | OUTPATIENT
Start: 2020-12-08 | End: 2020-12-08 | Stop reason: SDUPTHER

## 2020-12-08 RX ORDER — ONDANSETRON 2 MG/ML
4 INJECTION INTRAMUSCULAR; INTRAVENOUS
Status: DISCONTINUED | OUTPATIENT
Start: 2020-12-08 | End: 2020-12-08 | Stop reason: HOSPADM

## 2020-12-08 RX ORDER — SODIUM CHLORIDE 0.9 % (FLUSH) 0.9 %
10 SYRINGE (ML) INJECTION EVERY 12 HOURS SCHEDULED
Status: DISCONTINUED | OUTPATIENT
Start: 2020-12-08 | End: 2020-12-08 | Stop reason: HOSPADM

## 2020-12-08 RX ORDER — HYDRALAZINE HYDROCHLORIDE 20 MG/ML
5 INJECTION INTRAMUSCULAR; INTRAVENOUS
Status: DISCONTINUED | OUTPATIENT
Start: 2020-12-08 | End: 2020-12-08 | Stop reason: HOSPADM

## 2020-12-08 RX ORDER — LIDOCAINE HYDROCHLORIDE 20 MG/ML
INJECTION, SOLUTION EPIDURAL; INFILTRATION; INTRACAUDAL; PERINEURAL PRN
Status: DISCONTINUED | OUTPATIENT
Start: 2020-12-08 | End: 2020-12-08 | Stop reason: SDUPTHER

## 2020-12-08 RX ADMIN — SODIUM CHLORIDE: 9 INJECTION, SOLUTION INTRAVENOUS at 08:28

## 2020-12-08 RX ADMIN — PROPOFOL 200 MCG/KG/MIN: 10 INJECTION, EMULSION INTRAVENOUS at 09:31

## 2020-12-08 RX ADMIN — LIDOCAINE HYDROCHLORIDE 100 MG: 20 INJECTION, SOLUTION EPIDURAL; INFILTRATION; INTRACAUDAL; PERINEURAL at 09:31

## 2020-12-08 RX ADMIN — MIDAZOLAM 2 MG: 1 INJECTION INTRAMUSCULAR; INTRAVENOUS at 09:25

## 2020-12-08 ASSESSMENT — PAIN - FUNCTIONAL ASSESSMENT: PAIN_FUNCTIONAL_ASSESSMENT: 0-10

## 2020-12-08 ASSESSMENT — PULMONARY FUNCTION TESTS
PIF_VALUE: 0

## 2020-12-08 ASSESSMENT — PAIN DESCRIPTION - DESCRIPTORS: DESCRIPTORS: THROBBING

## 2020-12-08 ASSESSMENT — PAIN SCALES - GENERAL
PAINLEVEL_OUTOF10: 0
PAINLEVEL_OUTOF10: 0

## 2020-12-08 ASSESSMENT — ENCOUNTER SYMPTOMS: SHORTNESS OF BREATH: 0

## 2020-12-08 NOTE — PROGRESS NOTES
To pacu from OR. Pt asleep. Dressing to left hand dry and intact. Left radial pulse palpable. IV infusing. Monitor in sinus rhythm.

## 2020-12-08 NOTE — PROGRESS NOTES
Pt alert. Denies pain or discomfort at present. Elevated left hand. VSS. Up to chair. Given coffee and call light. Daughter to room.

## 2020-12-08 NOTE — H&P
Pre-operative Update of H&P:    I  have seen & examined Ms. Keke Ornelas related solely to her hand and upper extremity conditions, prior to the scheduled procedure on the date of her surgery. The indications for the planned surgical procedure & and her upper-extremity condition are unchanged.

## 2020-12-08 NOTE — OP NOTE
OPERATIVE REPORT          Patient:  Kraig Dejesus    YOB: 1980  Date of Service:  12/8/2020   Location:  [address] [place] [address]        Preoperative Diagnosis:  Left Ring Finger trigger finger. Postoperative Diagnosis: Same. Procedure: Left Ring Finger trigger finger release (A1 pulley release). Surgeon:    Kerry Ramirez. Millicent Sharma MD    Surgical Assistant:    [facility] Assistant    Anesthesia:  Local with sedation. Blood Loss:  Minimal.     Complications:  None. Tourniquet Time:  2 minutes. Indications:  Ms. Kraig Dejesus  is a 36y.o.  year old female with a Left Ring Finger trigger finger. I have discussed preoperatively with her the complications, limitations, expectations, alternatives and risks of the planned surgical care. She has voiced an understanding of our discussion. All of her questions have been fully answered to her satisfaction, and she has provided written informed consent to proceed. Procedure:  After written consent was obtained and the proper operative site was identified and marked, Ms. Kraig Dejesus  was brought to the operating room, placed in the supine position on the operating room table with the Left arm extended upon a hand table. Under an appropriate level of sedation, local anesthetic (1% Lidocaine and 1/2% Marcaine both without Epinephrine) was instilled in the planned surgical field. Her Left upper extremity was prepped and draped in the usual sterile fashion. After Eshmarch exsanguination the pneumo-tourniquet was inflated to 250 milimeters of mercury. A 1 centimeter oblique incision was fashioned over the base of the flexor tendon sheath of the Left Ring Finger. Dissection was carried carefully through the subcutaneous tissues, taking great care to identify and protect the neurovascular structures. The flexor tendon sheath was carefully identified and cleared of surrounding soft tissue.  The A1 pulley was identified and incised longitudinally along its entire length under direct visualization. The flexor tendons were gently withdrawn from the sheath and inspected. They were found to be in good condition. The tendons were returned to their appropriate location and the finger was placed through a full range of motion. There was no evidence of residual stenosis or triggering. The wound was irrigated copiously with sterile saline for irrigation and the pneumo-tourniquet was deflated after a period of 2 minutes elevation. Hemostasis was easily obtained with direct pressure and electrocautery. The wound was closed with interrupted sutures in the skin. The wound was dressed with adaptic, dry sterile gauze, and a bulky, hand based dressing was applied. Ms. Josiane Arellano  was awakened from light sedation, having tolerated the procedure without apparent complication, and was returned to the recovery room in stable condition. At the conclusion of the procedure all needle, instrument, and sponge counts were correct. Mckenzie John MD   12/8/2020, 9:42 AM

## 2020-12-08 NOTE — ANESTHESIA PRE PROCEDURE
Department of Anesthesiology  Preprocedure Note       Name:  Dustin Stone   Age:  36 y.o.  :  1980                                          MRN:  [id number]         Date:  2020      Surgeon: Maxim Rider):  Kaitlynn Wang MD    Procedure: Procedure(s):  LEFT RING FINGER TRIGGER FINGER RELEASE    Medications prior to admission:   Prior to Admission medications    Not on File       Current medications:    Current Facility-Administered Medications   Medication Dose Route Frequency Provider Last Rate Last Dose    0.9 % sodium chloride infusion   Intravenous Continuous Silvia Beckett MD 75 mL/hr at 20 4522      sodium chloride flush 0.9 % injection 10 mL  10 mL Intravenous 2 times per day Silvia Beckett MD        sodium chloride flush 0.9 % injection 10 mL  10 mL Intravenous PRN Silvia Beckett MD           Allergies:     Allergies   Allergen Reactions    Demerol      UNKNOWN    Monistat [Tioconazole]     Pineapple Itching       Problem List:    Patient Active Problem List   Diagnosis Code    Injury of ankle, right S99.911A    Fracture, fibula S82.409A    Chest pain R07.9    Threatened labor O47.9    Poor fetal growth affecting management of mother in third trimester O45.26    Normal labor O80, Z37.9    Poor fetal growth affecting management of mother in third trimester O45.26    Normal vaginal delivery O80    Vaginal delivery O80    Trigger ring finger of left hand M65.342       Past Medical History:        Diagnosis Date    Abnormal Pap smear of cervix     in past, recent normal    Anemia     Cervix dysplasia     Depression     no meds currently    Kidney stones     kidney stones    Postpartum depression     Zoloft helped       Past Surgical History:        Procedure Laterality Date    CHOLECYSTECTOMY      LASIK Bilateral     TONSILLECTOMY         Social History:    Social History     Tobacco Use    Smoking status: Never Smoker    Smokeless tobacco: Never Used Substance Use Topics    Alcohol use: No                                Counseling given: Not Answered      Vital Signs (Current):   Vitals:    12/04/20 1546 12/08/20 0812 12/08/20 0820   BP:   (!) 140/71   Pulse:   55   Resp:   18   Temp:   97 °F (36.1 °C)   TempSrc:   Temporal   SpO2:   98%   Weight: 248 lb (112.5 kg) 248 lb 3.8 oz (112.6 kg)    Height: 5' 8\" (1.727 m)                                                BP Readings from Last 3 Encounters:   12/08/20 (!) 140/71   12/02/20 122/76   11/02/20 124/86       NPO Status: Time of last liquid consumption: 2300                        Time of last solid consumption: 2300                        Date of last liquid consumption: 12/07/20                        Date of last solid food consumption: 12/07/20    BMI:   Wt Readings from Last 3 Encounters:   12/08/20 248 lb 3.8 oz (112.6 kg)   12/02/20 248 lb 6.4 oz (112.7 kg)   11/11/20 245 lb (111.1 kg)     Body mass index is 37.74 kg/m². CBC:   Lab Results   Component Value Date    WBC 7.6 11/02/2020    RBC 4.63 11/02/2020    HGB 11.9 11/02/2020    HCT 36.6 11/02/2020    MCV 79.1 11/02/2020    RDW 15.3 11/02/2020     11/02/2020       CMP:   Lab Results   Component Value Date     11/02/2020    K 4.0 11/02/2020     11/02/2020    CO2 25 11/02/2020    BUN 6 11/02/2020    CREATININE 0.7 11/02/2020    GFRAA >60 11/02/2020    GFRAA >60 03/12/2012    AGRATIO 1.4 11/02/2020    LABGLOM >60 11/02/2020    GLUCOSE 85 11/02/2020    PROT 6.9 11/02/2020    PROT 6.7 11/11/2010    CALCIUM 9.0 11/02/2020    BILITOT <0.2 11/02/2020    ALKPHOS 75 11/02/2020    AST 15 11/02/2020    ALT 16 11/02/2020       POC Tests: No results for input(s): POCGLU, POCNA, POCK, POCCL, POCBUN, POCHEMO, POCHCT in the last 72 hours.     Coags: No results found for: PROTIME, INR, APTT    HCG (If Applicable):   Lab Results   Component Value Date    PREGTESTUR Negative 12/08/2020        ABGs: No results found for: PHART, PO2ART, PXZ8HIH, QZZ1MZR, BEART, U3OTXGUN     Type & Screen (If Applicable):  No results found for: LABABO, LABRH    Drug/Infectious Status (If Applicable):  No results found for: HIV, HEPCAB    COVID-19 Screening (If Applicable):   Lab Results   Component Value Date    COVID19 NOT DETECTED 12/02/2020         Anesthesia Evaluation  Patient summary reviewed  Airway: Mallampati: III  TM distance: >3 FB   Neck ROM: full  Mouth opening: > = 3 FB Dental: normal exam         Pulmonary:       (-) COPD, asthma and shortness of breath                           Cardiovascular:        (-) hypertension, valvular problems/murmurs, past MI, CAD, CABG/stent, dysrhythmias and  angina                Neuro/Psych:   (+) psychiatric history:depression/anxiety    (-) seizures, TIA and CVA           GI/Hepatic/Renal:   (+) renal disease: kidney stones,      (-) GERD, PUD and liver disease       Endo/Other:        (-) diabetes mellitus               Abdominal:           Vascular: negative vascular ROS. Anesthesia Plan      MAC     ASA 3     (I discussed intravenous sedation to the patient's satisfaction including risks and alternatives. The patient agreed with the plan and has no further questions. LOUIS SWARTZ )  Induction: intravenous. Anesthetic plan and risks discussed with patient. Plan discussed with CRNA.                   Giuliano Lew MD   12/8/2020

## 2020-12-08 NOTE — PROGRESS NOTES
Pt tolerates PO and sitting up in chair. Denies pain at present. Bends and straightens left fingers as directed. Demonstrated proper elevation of left hand to pt. Pt dressing self. Daughter left to get the car.

## 2020-12-08 NOTE — ANESTHESIA POSTPROCEDURE EVALUATION
Department of Anesthesiology  Postprocedure Note    Patient: Keke Ornelas  MRN: [id number]  YOB: 1980  Date of evaluation: 12/8/2020  Time:  10:34 AM     Procedure Summary     Date:  12/08/20 Room / Location:  [address] [place] [address]    Anesthesia Start:  6884 Anesthesia Stop:  8996    Procedure:  LEFT RING FINGER TRIGGER FINGER RELEASE (Left ) Diagnosis:       Trigger ring finger of left hand      (LEFT RING FINGER TRIGGER FINGER)    Surgeon:  Tyrell Davey MD Responsible Provider:  Wil Saeed MD    Anesthesia Type:  MAC ASA Status:  3          Anesthesia Type: MAC    Martín Phase I: Martín Score: 10    Martín Phase II: Martín Score: 10    Last vitals: Reviewed and per EMR flowsheets.        Anesthesia Post Evaluation    Patient location during evaluation: PACU  Level of consciousness: awake and alert  Airway patency: patent  Nausea & Vomiting: no nausea and no vomiting  Complications: no  Cardiovascular status: blood pressure returned to baseline  Respiratory status: acceptable  Hydration status: euvolemic  Comments: Postoperative Anesthesia Note    Name:    Keke Ornelas  MRN:      [id number]    Patient Vitals in the past 12 hrs:  12/08/20 1026, BP:122/85, Pulse:55, Resp:12, SpO2:100 %  12/08/20 1006, BP:117/65, Temp:97.6 °F (36.4 °C), Temp src:Temporal, Pulse:63, Resp:18, SpO2:99 %  12/08/20 1000, BP:105/73, Temp:97.8 °F (36.6 °C), Pulse:61, Resp:15, SpO2:100 %  12/08/20 0956, BP:114/76, Pulse:65, Resp:18, SpO2:99 %  12/08/20 0951, BP:111/78, Pulse:70, Resp:17, SpO2:97 %  12/08/20 0944, BP:111/64, Temp:97.4 °F (36.3 °C), Temp src:Temporal, Pulse:75, Resp:18, SpO2:96 %  12/08/20 0820, BP:(!) 140/71, Temp:97 °F (36.1 °C), Temp src:Temporal, Pulse:55, Resp:18, SpO2:98 %  12/08/20 0812, Weight:248 lb 3.8 oz (112.6 kg)     LABS:    CBC  Lab Results       Component                Value               Date/Time                  WBC                      7.6 11/02/2020 11:06 AM        HGB                      11.9 (L)            11/02/2020 11:06 AM        HCT                      36.6                11/02/2020 11:06 AM        PLT                      330                 11/02/2020 11:06 AM   RENAL  Lab Results       Component                Value               Date/Time                  NA                       141                 11/02/2020 11:06 AM        K                        4.0                 11/02/2020 11:06 AM        CL                       102                 11/02/2020 11:06 AM        CO2                      25                  11/02/2020 11:06 AM        BUN                      6 (L)               11/02/2020 11:06 AM        CREATININE               0.7                 11/02/2020 11:06 AM        GLUCOSE                  85                  11/02/2020 11:06 AM   COAGS  No results found for: PROTIME, INR, APTT    Intake & Output:  @24HRIO@    Nausea & Vomiting:  No    Level of Consciousness:  Awake    Pain Assessment:  Adequate analgesia    Anesthesia Complications:  No apparent anesthetic complications    SUMMARY      Vital signs stable  OK to discharge from Stage I post anesthesia care.   Care transferred from Anesthesiology department on discharge from perioperative area

## 2020-12-09 ENCOUNTER — TELEPHONE (OUTPATIENT)
Dept: ORTHOPEDIC SURGERY | Age: 40
End: 2020-12-09

## 2020-12-09 NOTE — TELEPHONE ENCOUNTER
Other Patient is calling needing a call back. Patient is stating bandages are too tight.    Ph [phone]

## 2020-12-09 NOTE — TELEPHONE ENCOUNTER
I called patient and informed her to not remove the bandage. I told her that it is most likely from inflammation after surgery. She needs to elevate her hand above eye level as much as possible, drink plenty of water, take NSAIDS if needed, and if necessary to cut a small slit in the bandage to allow her more room but to make sure incision stays covered. I informed her to call with any other issues.

## 2021-05-24 ENCOUNTER — OFFICE VISIT (OUTPATIENT)
Dept: FAMILY MEDICINE CLINIC | Age: 41
End: 2021-05-24
Payer: MEDICARE

## 2021-05-24 VITALS
DIASTOLIC BLOOD PRESSURE: 78 MMHG | SYSTOLIC BLOOD PRESSURE: 120 MMHG | BODY MASS INDEX: 37.56 KG/M2 | OXYGEN SATURATION: 96 % | WEIGHT: 247 LBS | HEART RATE: 92 BPM

## 2021-05-24 DIAGNOSIS — M25.562 ACUTE PAIN OF LEFT KNEE: Primary | ICD-10-CM

## 2021-05-24 PROCEDURE — G8417 CALC BMI ABV UP PARAM F/U: HCPCS | Performed by: FAMILY MEDICINE

## 2021-05-24 PROCEDURE — G8427 DOCREV CUR MEDS BY ELIG CLIN: HCPCS | Performed by: FAMILY MEDICINE

## 2021-05-24 PROCEDURE — 99214 OFFICE O/P EST MOD 30 MIN: CPT | Performed by: FAMILY MEDICINE

## 2021-05-24 PROCEDURE — 1036F TOBACCO NON-USER: CPT | Performed by: FAMILY MEDICINE

## 2021-05-24 RX ORDER — FLUOXETINE HYDROCHLORIDE 20 MG/1
40 CAPSULE ORAL DAILY
COMMUNITY
End: 2022-10-31

## 2021-05-24 RX ORDER — TRAMADOL HYDROCHLORIDE 50 MG/1
50 TABLET ORAL EVERY 4 HOURS PRN
Qty: 42 TABLET | Refills: 0 | Status: SHIPPED | OUTPATIENT
Start: 2021-05-24 | End: 2021-05-31

## 2021-05-24 ASSESSMENT — PATIENT HEALTH QUESTIONNAIRE - PHQ9
1. LITTLE INTEREST OR PLEASURE IN DOING THINGS: 0
SUM OF ALL RESPONSES TO PHQ QUESTIONS 1-9: 0
SUM OF ALL RESPONSES TO PHQ QUESTIONS 1-9: 0

## 2021-05-24 NOTE — PROGRESS NOTES
[address] Note    Date: 5/24/2021                                               Subjective/Objective:     Chief Complaint   Patient presents with    Knee Pain     2 months can barely walk     Hip Pain       HPI   L knee pain starting 2 months ago. No injury. Hurts at rest or with walking. Hurts to bend it and straighten it. +locking up and giving out at times. Has been taking ibuprofen, which doesn't help much. Overall is getting worse. Patient Active Problem List    Diagnosis Date Noted    Trigger ring finger of left hand 11/11/2020    Poor fetal growth affecting management of mother in third trimester 12/07/2018    Normal labor 12/07/2018    Poor fetal growth affecting management of mother in third trimester 12/07/2018    Normal vaginal delivery 12/07/2018    Vaginal delivery 12/07/2018    Threatened labor 11/16/2018    Chest pain 09/18/2015    Injury of ankle, right 03/28/2014    Fracture, fibula 03/28/2014       Past Medical History:   Diagnosis Date    Abnormal Pap smear of cervix     in past, recent normal    Anemia     Cervix dysplasia     Depression     no meds currently    Kidney stones     kidney stones    Postpartum depression     Zoloft helped       Current Outpatient Medications   Medication Sig Dispense Refill    traMADol (ULTRAM) 50 MG tablet Take 1 tablet by mouth every 4 hours as needed for Pain for up to 7 days. Intended supply: 7 days. Take lowest dose possible to manage pain 42 tablet 0    FLUoxetine (PROZAC) 20 MG capsule Take 20 mg by mouth daily       No current facility-administered medications for this visit.        Allergies   Allergen Reactions    Demerol      UNKNOWN    Monistat [Tioconazole]     Pineapple Itching       Review of Systems   No fever, no cough    Vitals:  /78   Pulse 92   Wt 247 lb (112 kg)   SpO2 96%   BMI 37.56 kg/m²     Physical Exam   General:  Well-appearing, NAD, alert, non-toxic  HEENT:  Normocephalic, atraumatic. CHEST/LUNGS: No dyspnea  EXTREMETIES: + Decreased range of motion of left knee flexion due to pain.  + Significant pain elicited with movement of left knee. SKIN:  No rash, no cellulitis, no bruising, no petechiae/purpura/vesicles/pustules/abscess  PSYCH:  A+O x 3; normal affect  NEURO:  GCS 15, CN2-12 grossly intact, no focal motor/sensory deficits, + antalgic gait, normal speech      Assessment/Plan     72-year-old female with left knee pain. Concerning for possible meniscal injury. Will check MRI, refer to Ortho. Tramadol as needed for pain. Discussed with patient medication/s dosage, instructions, potential S/E, A/R and Drug Interaction  Tylenol or Motrin as needed for pain or fever  Advise to return here if worse or go to nearest ER  Encourage fluids  Pt discharged in stable condition at 14:22      1. Acute pain of left knee    - MRI KNEE LEFT WO CONTRAST; Future  - Angel Latif MD, Orthopedic Surgery, [facility]  - traMADol (ULTRAM) 50 MG tablet; Take 1 tablet by mouth every 4 hours as needed for Pain for up to 7 days. Intended supply: 7 days. Take lowest dose possible to manage pain  Dispense: 42 tablet; Refill: 0         Orders Placed This Encounter   Procedures    MRI KNEE LEFT WO CONTRAST     Standing Status:   Future     Standing Expiration Date:   5/24/2022     Order Specific Question:   Reason for exam:     Answer:   Knee pain   Angel Latif MD, Orthopedic Surgery, [facility]     Referral Priority:   Routine     Referral Type:   Eval and Treat     Referral Reason:   Specialty Services Required     Referred to Provider:   Adal Albert MD     Requested Specialty:   Orthopedic Surgery     Number of Visits Requested:   1       No follow-ups on file.     Chino Carias MD, MD    5/24/2021  2:32 PM

## 2021-06-07 ENCOUNTER — OFFICE VISIT (OUTPATIENT)
Dept: ORTHOPEDIC SURGERY | Age: 41
End: 2021-06-07
Payer: MEDICARE

## 2021-06-07 VITALS — BODY MASS INDEX: 37.59 KG/M2 | WEIGHT: 248 LBS | HEIGHT: 68 IN

## 2021-06-07 DIAGNOSIS — M25.562 CHRONIC PAIN OF LEFT KNEE: Primary | ICD-10-CM

## 2021-06-07 DIAGNOSIS — G89.29 CHRONIC PAIN OF LEFT KNEE: Primary | ICD-10-CM

## 2021-06-07 PROCEDURE — G8427 DOCREV CUR MEDS BY ELIG CLIN: HCPCS | Performed by: PHYSICIAN ASSISTANT

## 2021-06-07 PROCEDURE — G8417 CALC BMI ABV UP PARAM F/U: HCPCS | Performed by: PHYSICIAN ASSISTANT

## 2021-06-07 PROCEDURE — 1036F TOBACCO NON-USER: CPT | Performed by: PHYSICIAN ASSISTANT

## 2021-06-07 PROCEDURE — 99213 OFFICE O/P EST LOW 20 MIN: CPT | Performed by: PHYSICIAN ASSISTANT

## 2021-06-07 RX ORDER — DEXTROAMPHETAMINE SACCHARATE, AMPHETAMINE ASPARTATE MONOHYDRATE, DEXTROAMPHETAMINE SULFATE AND AMPHETAMINE SULFATE 7.5; 7.5; 7.5; 7.5 MG/1; MG/1; MG/1; MG/1
CAPSULE, EXTENDED RELEASE ORAL
COMMUNITY
Start: 2021-05-26

## 2021-06-07 RX ORDER — IBUPROFEN 200 MG
200 TABLET ORAL EVERY 6 HOURS PRN
COMMUNITY
End: 2022-04-18

## 2021-06-07 RX ORDER — TRAMADOL HYDROCHLORIDE 50 MG/1
50 TABLET ORAL EVERY 6 HOURS PRN
COMMUNITY
End: 2022-01-14 | Stop reason: ALTCHOICE

## 2021-06-07 NOTE — PROGRESS NOTES
Patient Name:Vero Cormier  Medical Record Number: [id number]  YOB: 1980  Date of Encounter: 6/7/2021     Chief Complaint   Patient presents with    Knee Pain     Left knee pain x3 mos. No known injury. History of Present Illness:  Tin Lester is a 36 y.o. female here at the recommendation of her PCP for evaluation of her left knee. Her pain assessment is documented below and I reviewed this with her today. Patient states she has had left knee pain for a very long time. This pain has been worsening over the last 3 months without any specific injury. She denies swelling, redness or warmth of the left knee. She does occasionally have some catching and popping. Pain is worse with walking or standing. Patient had an MRI ordered by her PCP however this has not yet been approved by her insurance. Pain Assessment  Location of Pain: Knee  Location Modifiers: Left  Severity of Pain: 10  Quality of Pain: Throbbing, Aching, Other (Comment), Locking (Stabbing, Tingling, Burning)  Duration of Pain: Persistent  Frequency of Pain: Constant  Date Pain First Started:  (x3 mos)  Aggravating Factors: Walking, Bending, Squatting, Straightening, Stairs, Other (Comment) (Laying in bed)  Limiting Behavior: Yes  Relieving Factors:  Other (Comment) (Nothing)  Result of Injury: No  Work-Related Injury: No  Are there other pain locations you wish to document?: No    Past Medical History:   Diagnosis Date    Abnormal Pap smear of cervix     in past, recent normal    Anemia     Cervix dysplasia     Depression     no meds currently    Kidney stones     kidney stones    Postpartum depression     Zoloft helped       Past Surgical History:   Procedure Laterality Date    CHOLECYSTECTOMY      FINGER TRIGGER RELEASE Left 12/8/2020    LEFT RING FINGER TRIGGER FINGER RELEASE performed by Meera Barksdale MD at [address] [place] [address] Steele Bandar ARIAS Bilateral     TONSILLECTOMY         Current Outpatient Medications   Medication Sig movements of the left knee so special testing is unreliable. There is no obvious ligamentous instability. Skin: There are no rashes, ulcerations or lesions. Sensation to light touch intact. Circulation: The limb is warm and well perfused. Distal pulses intact. Capillary refill is intact. Edema: none. Venous stasis changes: none. Gait: Patient has a antalgic gait and is taking short strides. Radiology:  X-rays obtained today and reviewed in office:  Views: 4 view left knee with comparison AP, flexion PA and skyline views of the right knee  Impression: No evidence for acute fracture or subluxation / dislocation. There are no loose bodies appreciated. There is no evidence of tibiofemoral subluxation. There are mild arthritic changes. Impression:  Encounter Diagnosis   Name Primary?  Chronic pain of left knee Yes       Office Procedures:  Orders Placed This Encounter   Procedures    XR KNEE BILATERAL STANDING     Standing Status:   Future     Number of Occurrences:   1     Standing Expiration Date:   6/7/2022     Order Specific Question:   Reason for exam:     Answer:   Knee Pain    XR KNEE LEFT (3 VIEWS)     3V Lt Knee AP Standing     Standing Status:   Future     Number of Occurrences:   1     Standing Expiration Date:   6/7/2022     Order Specific Question:   Reason for exam:     Answer:   Knee Pain       Treatment Plan:          I am unsure as to the exact etiology of patient's left knee pain. She has generalized tenderness on palpation of multiple areas. She has pain with range of motion testing and therefore special testing is unreliable. There is no obvious swelling or joint effusion. There is no obvious infectious or inflammatory process. She has great range of motion and strength. Her PCP ordered an MRI and she is still waiting for insurance approval.  Patient is advised to follow-up once MRI is completed. Parvez Desire was informed of the results of any imaging.  We discussed treatment options and a time was given to answer questions. A plan was proposed and OTILIAPasha CASTELLONPasha Altmaney understand and accepts this course of care. Electronically signed by Jean Valderrama PA-C on 5/1/9976  Board Certified [facility]    Please note that portions of this note were completed with a voice recognition program.  Efforts were made to edit the dictations but occasionally words are mis-transcribed.

## 2021-06-08 ENCOUNTER — HOSPITAL ENCOUNTER (OUTPATIENT)
Dept: MRI IMAGING | Age: 41
Discharge: HOME | End: 2021-06-08
Payer: MEDICARE

## 2021-06-08 DIAGNOSIS — M25.562 ACUTE PAIN OF LEFT KNEE: ICD-10-CM

## 2021-06-08 PROCEDURE — 73721 MRI JNT OF LWR EXTRE W/O DYE: CPT

## 2021-06-10 ENCOUNTER — TELEPHONE (OUTPATIENT)
Dept: ORTHOPEDIC SURGERY | Age: 41
End: 2021-06-10

## 2021-06-11 NOTE — TELEPHONE ENCOUNTER
LVM for pt to call back.  May schedule an appt with Dr. Cordelia Anna, Dr. Bk Moseley, Dr. Stephanie Buckner or Dr. Merlinda Sers

## 2021-06-14 ENCOUNTER — OFFICE VISIT (OUTPATIENT)
Dept: ORTHOPEDIC SURGERY | Age: 41
End: 2021-06-14
Payer: MEDICARE

## 2021-06-14 VITALS — HEIGHT: 68 IN | WEIGHT: 248 LBS | BODY MASS INDEX: 37.59 KG/M2

## 2021-06-14 DIAGNOSIS — M25.562 LEFT KNEE PAIN, UNSPECIFIED CHRONICITY: Primary | ICD-10-CM

## 2021-06-14 PROCEDURE — G8417 CALC BMI ABV UP PARAM F/U: HCPCS | Performed by: ORTHOPAEDIC SURGERY

## 2021-06-14 PROCEDURE — 1036F TOBACCO NON-USER: CPT | Performed by: ORTHOPAEDIC SURGERY

## 2021-06-14 PROCEDURE — G8427 DOCREV CUR MEDS BY ELIG CLIN: HCPCS | Performed by: ORTHOPAEDIC SURGERY

## 2021-06-14 PROCEDURE — 99215 OFFICE O/P EST HI 40 MIN: CPT | Performed by: ORTHOPAEDIC SURGERY

## 2021-06-14 PROCEDURE — 20610 DRAIN/INJ JOINT/BURSA W/O US: CPT | Performed by: ORTHOPAEDIC SURGERY

## 2021-06-14 RX ORDER — BUPIVACAINE HYDROCHLORIDE 5 MG/ML
4 INJECTION, SOLUTION PERINEURAL ONCE
Status: COMPLETED | OUTPATIENT
Start: 2021-06-14 | End: 2021-06-14

## 2021-06-14 RX ORDER — METHYLPREDNISOLONE ACETATE 40 MG/ML
40 INJECTION, SUSPENSION INTRA-ARTICULAR; INTRALESIONAL; INTRAMUSCULAR; SOFT TISSUE ONCE
Status: COMPLETED | OUTPATIENT
Start: 2021-06-14 | End: 2021-06-14

## 2021-06-14 RX ADMIN — METHYLPREDNISOLONE ACETATE 40 MG: 40 INJECTION, SUSPENSION INTRA-ARTICULAR; INTRALESIONAL; INTRAMUSCULAR; SOFT TISSUE at 15:39

## 2021-06-14 RX ADMIN — BUPIVACAINE HYDROCHLORIDE 20 MG: 5 INJECTION, SOLUTION PERINEURAL at 15:39

## 2021-06-14 NOTE — Clinical Note
Dear Dr. Carrion Doctor,    I had the pleasure of evaluating your patient to my clinic. Please see the attached note for full details of the visit.     With kind regards,  Santos Glez MD

## 2021-06-15 NOTE — PROGRESS NOTES
6/14/2021     Reason for visit:  Left knee pain    History of Present Illness: The patient is a 80-year-old female who presents for evaluation of her left knee. She presents a referral from my partner Dr. yDan Cardona. She reports pain for the past 4 to 6 months. No traumatic injury. She localizes the pain to the anterior deep aspect of the knee. She occasionally experiences swelling. Occasional catching locking of the knee. The pain is made worse with standing, walking, stairs, bending, kneeling. She has had no formal treatment for her knee condition.     Medical History:  Past Medical History:   Diagnosis Date    Abnormal Pap smear of cervix     in past, recent normal    Anemia     Cervix dysplasia     Depression     no meds currently    Kidney stones     kidney stones    Postpartum depression     Zoloft helped      Past Surgical History:   Procedure Laterality Date    CHOLECYSTECTOMY      FINGER TRIGGER RELEASE Left 12/8/2020    LEFT RING FINGER TRIGGER FINGER RELEASE performed by Batsheva Ibarra MD at [address] [place] [address] Bilateral     TONSILLECTOMY        Family History   Problem Relation Age of Onset    Heart Failure Mother     Heart Attack Father     Diabetes Father     Cancer Father         Lung cancer    Arthritis Other     Asthma Other     Cancer Other     Diabetes Other     Heart Disease Other     High Blood Pressure Other     Kidney Disease Other       Social History     Socioeconomic History    Marital status: Single     Spouse name: Not on file    Number of children: 10    Years of education: Not on file    Highest education level: Not on file   Occupational History    Occupation: student    Occupation: stay at home mom   Tobacco Use    Smoking status: Never Smoker    Smokeless tobacco: Never Used   Vaping Use    Vaping Use: Never used   Substance and Sexual Activity    Alcohol use: No    Drug use: No    Sexual activity: Yes     Partners: Male   Other Topics Concern  Not on file   Social History Narrative    Not on file     Social Determinants of Health     Financial Resource Strain:     Difficulty of Paying Living Expenses:    Food Insecurity:     Worried About Running Out of Food in the Last Year:     [address] in the Last Year:    Transportation Needs:     Lack of Transportation (Medical):  Lack of Transportation (Non-Medical):    Physical Activity:     Days of Exercise per Week:     Minutes of Exercise per Session:    Stress:     Feeling of Stress :    Social Connections:     Frequency of Communication with Friends and Family:     Frequency of Social Gatherings with Friends and Family:     Attends Hindu Services:     Active Member of Clubs or Organizations:     Attends Club or Organization Meetings:     Marital Status:    Intimate Partner Violence:     Fear of Current or Ex-Partner:     Emotionally Abused:     Physically Abused:     Sexually Abused:       Current Outpatient Medications on File Prior to Visit   Medication Sig Dispense Refill    traMADol (ULTRAM) 50 MG tablet Take 50 mg by mouth every 6 hours as needed for Pain.  ibuprofen (ADVIL;MOTRIN) 200 MG tablet Take 200 mg by mouth every 6 hours as needed for Pain      amphetamine-dextroamphetamine (ADDERALL XR) 30 MG extended release capsule TAKE 1 CAPSULE BY MOUTH 1 TIME A DAY IN THE MORNING      FLUoxetine (PROZAC) 20 MG capsule Take 40 mg by mouth daily        No current facility-administered medications on file prior to visit. Allergies   Allergen Reactions    Demerol      UNKNOWN    Monistat [Tioconazole]     Pineapple Itching        Review of Systems:  Constitutional: Patient is adequately groomed with no evidence of malnutrition  Mental Status: The patient is oriented to time, place and person. The patient's mood and affect are appropriate. Lymphatic: The lymphatic examination bilaterally reveals all areas to be without enlargement or induration.   Vascular: assess her response. If she does remain symptomatic in the future we could consider further surgical intervention in the form of cartilage restoration surgery. Greater than 40 minutes were spent with this encounter. Time spent included evaluating the patient's chart prior to arrival.  Evaluating the patient in the office including history, physical examination, imaging reviewing, and counseling on next steps. Lastly, time was spent discussing orders with my staff as well as providing documentation in the chart. Waqar Hilario MD            Orthopaedic Surgery Sports Medicine and [address] and [address] [facility]            Team Physician [facility]      Disclaimer: This note was dictated with voice recognition software. Though review and correction are routine, we apologize for any errors.

## 2021-06-21 ENCOUNTER — HOSPITAL ENCOUNTER (OUTPATIENT)
Dept: PHYSICAL THERAPY | Age: 41
Setting detail: THERAPIES SERIES
Discharge: HOME | End: 2021-06-21
Payer: MEDICARE

## 2021-06-23 ENCOUNTER — HOSPITAL ENCOUNTER (OUTPATIENT)
Dept: PHYSICAL THERAPY | Age: 41
Setting detail: THERAPIES SERIES
Discharge: HOME | End: 2021-06-23
Payer: MEDICARE

## 2021-06-23 PROCEDURE — 97140 MANUAL THERAPY 1/> REGIONS: CPT

## 2021-06-23 PROCEDURE — 97016 VASOPNEUMATIC DEVICE THERAPY: CPT

## 2021-06-23 PROCEDURE — 97161 PT EVAL LOW COMPLEX 20 MIN: CPT

## 2021-06-23 NOTE — PLAN OF CARE
[facility]  [address] [address]  Phone [phone]   Fax [phone]                                                       Physical Therapy Certification    Dear Referring Practitioner: Naveen Landaverde MD,    We had the pleasure of evaluating the following patient for physical therapy services at [address] [place] [address]. A summary of our findings can be found in the initial assessment below. This includes our plan of care. If you have any questions or concerns regarding these findings, please do not hesitate to contact me at the office phone number checked above.   Thank you for the referral.       Physician Signature:_______________________________Date:__________________  By signing above (or electronic signature), therapists plan is approved by physician      Patient: Yusuf Pardo   : 1980   MRN: [id number]  Referring Physician: Referring Practitioner: Naveen Landaverde MD      Evaluation Date: 2021      Medical Diagnosis Information:  Diagnosis: M25.562 (ICD-10-CM) - Left knee pain, unspecified chronicity   Treatment Diagnosis: L knee pain                                         Insurance information: PT Insurance Information: [employer]     Precautions/ Contra-indications:     C-SSRS Triggered by Intake questionnaire (Past 2 wk assessment):   [x] No, Questionnaire did not trigger screening.   [] Yes, Patient intake triggered further evaluation      [] C-SSRS Screening completed  [] PCP notified via Plan of Care  [] Emergency services notified     Latex Allergy:  [x]NO      []YES  Preferred Language for Healthcare:   [x]English       []other:    SUBJECTIVE: Patient stated complaint: See body chart      Relevant Medical History: Depression   Functional Disability Index: LEFS: 16/80    Pain Scale: 3-10/10  Easing factors: See body chart   Provocative factors: See body chart      Type: []Constant   []Intermittent  [x]Radiating [x]Localized []other:     Numbness/Tingling: Patient reports intermittent and random radiating thigh pain from L hip to R knee. 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      Living Status/Prior Level of Function: Independent with ADLs and IADLs, volleyball, lifting    OBJECTIVE:     ROM LEFT RIGHT   HIP Flex 45 with knee pain 110   HIP Abd     HIP Ext     HIP IR Unable to assess due to pain WNL   HIP ER Unable to assess due to pain WNL   Knee ext 0 0   Knee Flex 72 130   Ankle PF     Ankle DF     Ankle In     Ankle Ev     Strength  LEFT RIGHT   HIP Flexors     HIP Abductors     HIP Ext     Hip ER     Knee EXT (quad) Unable to assess due to pain    Knee Flex (HS) Unable to assess due to pain    Ankle DF     Ankle PF     Ankle Inv     Ankle EV          Circumference  Mid apex  7 cm prox               Balance: Limited SLS bilaterally 15 seconds on R and 7 seconds on L. Reflexes/Sensation:    []Dermatomes/Myotomes intact    []Reflexes equal and normal bilaterally   [x]Other: Diminished S1 reflex bilaterally. Joint mobility:  Hypomobility of the L knee due to pain. Patella hypomobility noted during evaluation which improved with mobilization. Significant pain with all motion at this time. []Normal    [x]Hypo   []Hyper    Palpation: TTP along lateral aspect of L patella. Moderate swelling around patella noted during session. Functional Mobility/Transfers: Patient reports difficulty with prolonged standing, sit to stand, stair ascend/descend, prolonged ambulation, sleeping, and squatting. Posture: unremarkable     Bandages/Dressings/Incisions:     Gait: (include devices/WB status) Antalgic gait on L with reduced TKE during heel strike and reduced single limb stance time on L. Significant limp while weight bearing on L during gait.      Orthopedic Special Tests: Valgus stress test negative, varus stress test negative. Patient unable to tolerate Lachman or anterior drawer position on L. [x] Patient history, allergies, meds reviewed. Medical chart reviewed. See intake form. Review Of Systems (ROS):  [x]Performed Review of systems (Integumentary, CardioPulmonary, Neurological) by intake and observation. Intake form has been scanned into medical record. Patient has been instructed to contact their primary care physician regarding ROS issues if not already being addressed at this time. Co-morbidities/Complexities (which will affect course of rehabilitation):   []None           Arthritic conditions   []Rheumatoid arthritis (M05.9)  []Osteoarthritis (M19.91)   Cardiovascular conditions   []Hypertension (I10)  []Hyperlipidemia (E78.5)  []Angina pectoris (I20)  []Atherosclerosis (I70)   Musculoskeletal conditions   []Disc pathology   []Congenital spine pathologies   []Prior surgical intervention  []Osteoporosis (M81.8)  []Osteopenia (M85.8)   Endocrine conditions   []Hypothyroid (E03.9)  []Hyperthyroid Gastrointestinal conditions   []Constipation (T36.94)   Metabolic conditions   []Morbid obesity (E66.01)  []Diabetes type 1(E10.65) or 2 (E11.65)   []Neuropathy (G60.9)     Pulmonary conditions   []Asthma (J45)  []Coughing   []COPD (J44.9)   Psychological Disorders  []Anxiety (F41.9)  [x]Depression (F32.9)   []Other:   []Other:          Barriers to/and or personal factors that will affect rehab potential:              []Age  []Sex              []Motivation/Lack of Motivation                        []Co-Morbidities              []Cognitive Function, education/learning barriers              []Environmental, home barriers              []profession/work barriers  []past PT/medical experience  []other:  Justification:     Falls Risk Assessment (30 days):   [x] Falls Risk assessed and no intervention required.   [] Falls Risk assessed and Patient requires intervention due to being higher risk   TUG score (>12s at risk):     [] Falls education provided, including         ASSESSMENT: Patient is a 36year old female who presents with L knee and hip pain that began 4 months ago without injury. Patient presents with significant pain with all functional activities and weight bearing. Patient displayed hypomobility of the L knee, patella, and hip. Patient responded well to treatment with pain reduction and improved function following AAROM and patella taping. HEP provided to encourage gentle pain-free ROM and symptom modification. Functional Impairments:     [x]Noted lumbar/proximal hip/LE joint hypomobility   []Decreased LE functional ROM   [x]Decreased core/proximal hip strength and neuromuscular control   [x]Decreased LE functional strength   [x]Reduced balance/proprioceptive control   []other:      Functional Activity Limitations (from functional questionnaire and intake)   [x]Reduced ability to tolerate prolonged functional positions   [x]Reduced ability or difficulty with changes of positions or transfers between positions   []Reduced ability to maintain good posture and demonstrate good body mechanics with sitting, bending, and lifting   [x]Reduced ability to sleep   [] Reduced ability or tolerance with driving and/or computer work   [x]Reduced ability to perform lifting, carrying tasks   [x]Reduced ability to squat   [x]Reduced ability to forward bend   [x]Reduced ability to ambulate prolonged functional periods/distances/surfaces   []Reduced ability to ascend/descend stairs   [x]Reduced ability to run, hop, cut or jump   []other:    Participation Restrictions   [x]Reduced participation in self care activities   [x]Reduced participation in home management activities   [x]Reduced participation in work activities   [x]Reduced participation in social activities. [x]Reduced participation in sport/recreation activities.     Classification :    []Signs/symptoms consistent with post-surgical status including decreased ROM, strength and function. []Signs/symptoms consistent with joint sprain/strain   [x]Signs/symptoms consistent with patella-femoral syndrome   []Signs/symptoms consistent with knee OA/hip OA   [x]Signs/symptoms consistent with internal derangement of knee/Hip   []Signs/symptoms consistent with functional hip weakness/NMR control      []Signs/symptoms consistent with tendinitis/tendinosis    []signs/symptoms consistent with pathology which may benefit from Dry needling      []other:      Prognosis/Rehab Potential:      []Excellent   [x]Good    []Fair   []Poor    Tolerance of evaluation/treatment:    []Excellent   [x]Good    []Fair   []Poor    Physical Therapy Evaluation Complexity Justification  [x] A history of present problem with:  [] no personal factors and/or comorbidities that impact the plan of care;  [x]1-2 personal factors and/or comorbidities that impact the plan of care  []3 personal factors and/or comorbidities that impact the plan of care  [x] An examination of body systems using standardized tests and measures addressing any of the following: body structures and functions (impairments), activity limitations, and/or participation restrictions;:  [x] a total of 1-2 or more elements   [] a total of 3 or more elements   [] a total of 4 or more elements   [x] A clinical presentation with:  [x] stable and/or uncomplicated characteristics   [] evolving clinical presentation with changing characteristics  [] unstable and unpredictable characteristics;   [x] Clinical decision making of [x] low, [] moderate, [] high complexity using standardized patient assessment instrument and/or measurable assessment of functional outcome.     [x] EVAL (LOW) 40059 (typically 30 minutes face-to-face)  [] EVAL (MOD) 81674 (typically 30 minutes face-to-face)  [] EVAL (HIGH) 05779 (typically 45 minutes face-to-face)  [] RE-EVAL     PLAN:   Frequency/Duration:  1-2 days per week for 6-8 Weeks:  Interventions:  [x]  Therapeutic exercise including: strength training, ROM, for Lower extremity and core   [x]  NMR activation and proprioception for LE, Glutes and Core   [x]  Manual therapy as indicated for LE, Hip and spine to include: Dry Needling/IASTM, STM, PROM, Gr I-IV mobilizations, manipulation. [x] Modalities as needed that may include: thermal agents, E-stim, Biofeedback, US, iontophoresis as indicated  [x] Patient education on joint protection, postural re-education, activity modification, progression of HEP. HEP instruction: (see scanned forms)    GOALS:  Patient stated goal: To increase ROM, reduce use of pain medication, and  \"feel better\"  [] Progressing: [] Met: [] Not Met: [] Adjusted    Therapist goals for Patient:   Short Term Goals: To be achieved in: 2 weeks  1. Independent in HEP and progression per patient tolerance, in order to prevent re-injury. [] Progressing: [] Met: [] Not Met: [] Adjusted  2. Patient will have a decrease in pain to facilitate improvement in movement, function, and ADLs as indicated by Functional Deficits. [] Progressing: [] Met: [] Not Met: [] Adjusted    Long Term Goals: To be achieved in: 6-8 weeks  1. Disability index score of 32 or more for the LEFS to assist with reaching prior level of function. [] Progressing: [] Met: [] Not Met: [] Adjusted  2. Patient will demonstrate increased AROM to WNL to allow for proper joint functioning as indicated by patients Functional Deficits. [] Progressing: [] Met: [] Not Met: [] Adjusted  3. Patient will demonstrate an increase in Strength to good proximal hip strength and control, within 5lb HHD in LE to allow for proper functional mobility as indicated by patients Functional Deficits. [] Progressing: [] Met: [] Not Met: [] Adjusted  4. Patient will return to stair ascend/descend, squatting, ambulation/standing, sleeping, and functional activities without increased symptoms or restriction. [] Progressing: [] Met: [] Not Met: [] Adjusted  5.  To return to recreational volleyball and reduce pain with ADLs (patient specific functional goal)    [] Progressing: [] Met: [] Not Met: [] Adjusted     Electronically signed by:  Mandy Guerrero PT

## 2021-06-23 NOTE — FLOWSHEET NOTE
[facility]    Physical Therapy Treatment Note/ Progress Report:     Date:  2021    Patient Name:  Joseline Watson    :  1980  MRN: [id number]  Medical/Treatment Diagnosis Information:  · Diagnosis: M25.562 (ICD-10-CM) - Left knee pain, unspecified chronicity  · Treatment Diagnosis: L knee pain  Insurance/Certification information:  PT Insurance Information: [place] [address] [place]  Physician Information:  Referring Practitioner: Karoline Apley, MD  Plan of care signed (Y/N):     Date of Patient follow up with Physician:      Progress Report: []  Yes  []  No     Functional Scale: LEFS    Date: 21    Date Range for reporting period:  Beginnin21  Ending:      Progress report due (10 Rx/or 30 days whichever is less): 96     Recertification due (POC duration/ or 90 days whichever is less):      Visit # Insurance Allowable Auth Needed   1 30 []Yes    []No     Pain level:  3-10/10     SUBJECTIVE:  See eval    OBJECTIVE: See eval   Observation:    Test measurements:      RESTRICTIONS/PRECAUTIONS: standing, ambulation, stairs, forward bending, lifting, sleeping, squatting     Exercises/Interventions:     Therapeutic Ex Resistance Sets/sec Reps Notes          Supine heel slide   1 10    Long sitting calf stretch                                                                         Therapeutic Activities                                                               Manual Intervention 15'       Knee mobs/PROM       Tib/Fem Mobs       Patella Mobs    Hypomobility noted and painful   Ankle mobs       Patellar taping    significant pain reduction           NMR re-education                                                                          Therapeutic Exercise and NMR EXR  [x] (82908) Provided verbal/tactile cueing for activities related to strengthening, flexibility, endurance, ROM for improvements in LE, proximal hip, and core control with self care, mobility, lifting, ambulation.  [] (65034) Provided verbal/tactile cueing for activities related to improving balance, coordination, kinesthetic sense, posture, motor skill, proprioception  to assist with LE, proximal hip, and core control in self care, mobility, lifting, ambulation and eccentric single leg control. NMR and Therapeutic Activities:    [] (55244 or 07300) Provided verbal/tactile cueing for activities related to improving balance, coordination, kinesthetic sense, posture, motor skill, proprioception and motor activation to allow for proper function of core, proximal hip and LE with self care and ADLs  [] (32945) Gait Re-education- Provided training and instruction to the patient for proper LE, core and proximal hip recruitment and positioning and eccentric body weight control with ambulation re-education including up and down stairs     Home Exercise Program:    [x] (28280) Reviewed/Progressed HEP activities related to strengthening, flexibility, endurance, ROM of core, proximal hip and LE for functional self-care, mobility, lifting and ambulation/stair navigation   [] (98973)Reviewed/Progressed HEP activities related to improving balance, coordination, kinesthetic sense, posture, motor skill, proprioception of core, proximal hip and LE for self care, mobility, lifting, and ambulation/stair navigation      Manual Treatments:  PROM / STM / Oscillations-Mobs:  G-I, II, III, IV (PA's, Inf., Post.)  [x] (66613) Provided manual therapy to mobilize LE, proximal hip and/or LS spine soft tissue/joints for the purpose of modulating pain, promoting relaxation,  increasing ROM, reducing/eliminating soft tissue swelling/inflammation/restriction, improving soft tissue extensibility and allowing for proper ROM for normal function with self care, mobility, lifting and ambulation. Modalities:  Vaso 15 min to reduce pain and swelling.     Charges:  Timed Code Treatment Minutes: 15 Not Met: [] Adjusted  5. To return to recreational volleyball and reduce pain with ADLs (patient specific functional goal)    [] Progressing: [] Met: [] Not Met: [] Adjusted     Progression Towards Functional goals:  [] Patient is progressing as expected towards functional goals listed. [] Progression is slowed due to complexities listed. [] Progression has been slowed due to co-morbidities. [x] Plan just implemented, too soon to assess goals progression  [] Other:     ASSESSMENT:  See eval    Return to Play: (if applicable)   []  Stage 1: Intro to Strength   []  Stage 2: Return to Run and Strength   []  Stage 3: Return to Jump and Strength   []  Stage 4: Dynamic Strength and Agility   []  Stage 5: Sport Specific Training     []  Ready to Return to Play, Meets All Above Stages   []  Not Ready for Return to Sports   Comments:            Treatment/Activity Tolerance:  [x] Patient tolerated treatment well [] Patient limited by fatique  [] Patient limited by pain  [] Patient limited by other medical complications  [] Other:     Overall Progression Towards Functional goals/ Treatment Progress Update:  [] Patient is progressing as expected towards functional goals listed. [] Progression is slowed due to complexities/Impairments listed. [] Progression has been slowed due to co-morbidities.   [x] Plan just implemented, too soon to assess goals progression <30days   [] Goals require adjustment due to lack of progress  [] Patient is not progressing as expected and requires additional follow up with physician  [] Other    Prognosis for POC: [x] Good [] Fair  [] Poor    Patient requires continued skilled intervention: [x] Yes  [] No        PLAN: See danielle  [] Continue per plan of care [] Alter current plan (see comments)  [x] Plan of care initiated [] Hold pending MD visit [] Discharge    Electronically signed by: Shanti Owens PT    Note: If patient does not return for scheduled/recommended follow up visits, this note will serve as a discharge from care along with the most recent update on progress.

## 2021-06-28 ENCOUNTER — HOSPITAL ENCOUNTER (OUTPATIENT)
Dept: PHYSICAL THERAPY | Age: 41
Setting detail: THERAPIES SERIES
Discharge: HOME | End: 2021-06-28
Payer: MEDICARE

## 2021-06-28 NOTE — PLAN OF CARE
[facility]    Physical Therapy  Cancellation/No-show Note  Patient Name:  Radha Abraham  :  1980   Date:  2021  Cancelled visits to date: 1  No-shows to date: 0    For today's appointment patient:  [x]  Cancelled  []  Rescheduled appointment  []  No-show     Reason given by patient:  []  Patient ill  []  Conflicting appointment  []  No transportation    []  Conflict with work  []  No reason given  [x]  Other:  Patient experienced difficult with  cancelling. Comments:      Phone call information:   []  Phone call made today to patient at _ time at number provided:      []  Patient answered, conversation as follows:    []  Patient did not answer, message left as follows:  []  Phone call not made today  [x]  Phone call not needed - pt contacted us to cancel and provided reason for cancellation.      Electronically signed by:  Kaylee Bangura, PT, PT

## 2021-07-06 ENCOUNTER — HOSPITAL ENCOUNTER (OUTPATIENT)
Dept: PHYSICAL THERAPY | Age: 41
Setting detail: THERAPIES SERIES
Discharge: HOME | End: 2021-07-06
Payer: MEDICARE

## 2021-07-06 NOTE — PLAN OF CARE
[facility]    Physical Therapy  Cancellation/No-show Note  Patient Name:  Ada Jolly  :  1980   Date:  2021  Cancelled visits to date: 3  No-shows to date: 0    For today's appointment patient:  [x]  Cancelled  []  Rescheduled appointment  []  No-show     Reason given by patient:  [x]  Patient ill  []  Conflicting appointment  []  No transportation    []  Conflict with work  []  No reason given  [x]  Other:  Scheduling conflict. Comments:      Phone call information:   []  Phone call made today to patient at _ time at number provided:      []  Patient answered, conversation as follows:    []  Patient did not answer, message left as follows:  []  Phone call not made today  []  Phone call not needed - pt contacted us to cancel and provided reason for cancellation.      Electronically signed by:  Cecilia Covington [place], [id number]

## 2021-07-08 ENCOUNTER — HOSPITAL ENCOUNTER (OUTPATIENT)
Dept: PHYSICAL THERAPY | Age: 41
Setting detail: THERAPIES SERIES
Discharge: HOME | End: 2021-07-08
Payer: MEDICARE

## 2021-07-08 NOTE — PLAN OF CARE
[facility], [facility]    Physical Therapy  Cancellation/No-show Note  Patient Name:  Monet Herman  :  1980   Date:  2021  Cancelled visits to date: 4  No-shows to date: 0    For today's appointment patient:  [x]  Cancelled  []  Rescheduled appointment  []  No-show     Reason given by patient:  []  Patient ill  []  Conflicting appointment  []  No transportation    []  Conflict with work  []  No reason given  [x]  Other:  Patient is currently experiencing \"overhwelming\" family related emergencies and work obligations. Patient reports at this time she will not be able to attend PT due to time constraints. Comments:      Phone call information:   []  Phone call made today to patient at _ time at number provided:      []  Patient answered, conversation as follows:    []  Patient did not answer, message left as follows:  []  Phone call not made today  [x]  Phone call not needed - pt contacted us to cancel and provided reason for cancellation.      Electronically signed by:  Brent Solano, [address] [place] [address], [id number]

## 2021-07-26 ENCOUNTER — OFFICE VISIT (OUTPATIENT)
Dept: ORTHOPEDIC SURGERY | Age: 41
End: 2021-07-26
Payer: MEDICARE

## 2021-07-26 VITALS — BODY MASS INDEX: 37.59 KG/M2 | HEIGHT: 68 IN | WEIGHT: 248 LBS

## 2021-07-26 DIAGNOSIS — M17.12 OSTEOARTHRITIS OF LEFT KNEE, UNSPECIFIED OSTEOARTHRITIS TYPE: Primary | ICD-10-CM

## 2021-07-26 PROCEDURE — G8427 DOCREV CUR MEDS BY ELIG CLIN: HCPCS | Performed by: ORTHOPAEDIC SURGERY

## 2021-07-26 PROCEDURE — G8417 CALC BMI ABV UP PARAM F/U: HCPCS | Performed by: ORTHOPAEDIC SURGERY

## 2021-07-26 PROCEDURE — 1036F TOBACCO NON-USER: CPT | Performed by: ORTHOPAEDIC SURGERY

## 2021-07-26 PROCEDURE — 99213 OFFICE O/P EST LOW 20 MIN: CPT | Performed by: ORTHOPAEDIC SURGERY

## 2021-07-27 NOTE — PROGRESS NOTES
7/26/2021     Reason for visit:  Left knee pain    History of Present Illness: The patient is a 29-year-old female who presents for evaluation of her left knee. She presents a referral from my partner Dr. Chuy Wright. She reports pain for the past 4 to 6 months. No traumatic injury. She localizes the pain to the anterior deep aspect of the knee. She occasionally experiences swelling. Occasional catching locking of the knee. The pain is made worse with standing, walking, stairs, bending, kneeling. She has had no formal treatment for her knee condition. At the last visit we elected proceed with cortisone injection. We also sent her to physical therapy. She reports no relief from the injection. Objective:  Ht 5' 8\" (1.727 m)   Wt 248 lb (112.5 kg)   BMI 37.71 kg/m²      Physical Exam:  The patient is well-appearing and in no apparent distress  Examination of the left knee   There is a small effusion, no gross deformity or skin changes  Range of motion reveals 0 to 130  neg lachman, negative posterior drawer, no pain or laxity with varus or valgus stress at 0 degrees and 30 degrees of flexion  neg joint line tenderness  5 out of 5 strength throughout distal muscle groups  Sensation is intact to light touch throughout all distributions  There is no calf swelling or tenderness  Palpable DP pulse, brisk cap refill, 2+ symmetric reflexes    Imaging:  X-rays of the left knee were reviewed. There is no fracture or dislocation. No other abnormality. MRI of the left knee was reviewed. There is degeneration of the body of the lateral meniscus. 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 We discussed operative and nonoperative management. At this point I do recommend nonoperative management.   Nonoperative treatment options include activity modification, anti-inflammatory medications, physical therapy, and injections. since she did not respond to the cortisone injection we have elected to proceed with submitting for hyaluronic acid. She will return following approval.  If she does remain symptomatic in the future we could consider further surgical intervention in the form of cartilage restoration surgery. Alanna Moses MD            Orthopaedic Surgery Sports Medicine and [address] and [address] [facility] Physician [facility] ([place]      Disclaimer: This note was dictated with voice recognition software. Though review and correction are routine, we apologize for any errors.

## 2021-07-28 ENCOUNTER — TELEPHONE (OUTPATIENT)
Dept: ORTHOPEDIC SURGERY | Age: 41
End: 2021-07-28

## 2021-07-28 DIAGNOSIS — M17.12 OSTEOARTHRITIS OF LEFT KNEE, UNSPECIFIED OSTEOARTHRITIS TYPE: Primary | ICD-10-CM

## 2021-07-28 NOTE — TELEPHONE ENCOUNTER
Submitted PA for [facility]  Via FAX TO [employer] STAT[employer]: NO PA REQUIRED; letter attached. I routed message to clinic via referral message.

## 2021-08-20 ENCOUNTER — TELEPHONE (OUTPATIENT)
Dept: ORTHOPEDIC SURGERY | Age: 41
End: 2021-08-20

## 2021-08-30 ENCOUNTER — OFFICE VISIT (OUTPATIENT)
Dept: ORTHOPEDIC SURGERY | Age: 41
End: 2021-08-30
Payer: MEDICARE

## 2021-08-30 VITALS — HEIGHT: 68 IN | BODY MASS INDEX: 37.59 KG/M2 | WEIGHT: 248 LBS

## 2021-08-30 DIAGNOSIS — M17.12 OSTEOARTHRITIS OF LEFT KNEE, UNSPECIFIED OSTEOARTHRITIS TYPE: Primary | ICD-10-CM

## 2021-08-30 PROCEDURE — G8417 CALC BMI ABV UP PARAM F/U: HCPCS | Performed by: ORTHOPAEDIC SURGERY

## 2021-08-30 PROCEDURE — 20610 DRAIN/INJ JOINT/BURSA W/O US: CPT | Performed by: ORTHOPAEDIC SURGERY

## 2021-08-30 PROCEDURE — 1036F TOBACCO NON-USER: CPT | Performed by: ORTHOPAEDIC SURGERY

## 2021-08-30 PROCEDURE — 99213 OFFICE O/P EST LOW 20 MIN: CPT | Performed by: ORTHOPAEDIC SURGERY

## 2021-08-30 PROCEDURE — G8427 DOCREV CUR MEDS BY ELIG CLIN: HCPCS | Performed by: ORTHOPAEDIC SURGERY

## 2021-08-30 NOTE — PROGRESS NOTES
8/30/2021     Reason for visit:  Left knee pain    History of Present Illness: The patient is a 57-year-old female who presents for evaluation of her left knee. She presents a referral from my partner Dr. Anthony Carver. She reports pain for the past 4 to 6 months. No traumatic injury. She localizes the pain to the anterior deep aspect of the knee. She occasionally experiences swelling. Occasional catching locking of the knee. The pain is made worse with standing, walking, stairs, bending, kneeling. She has had no formal treatment for her knee condition. At the last visit we elected proceed with cortisone injection. We also sent her to physical therapy. She reports no relief from the injection. Objective:  Ht 5' 8\" (1.727 m)   Wt 248 lb (112.5 kg)   BMI 37.71 kg/m²      Physical Exam:  The patient is well-appearing and in no apparent distress  Examination of the left knee   There is a small effusion, no gross deformity or skin changes  Range of motion reveals 0 to 130  neg lachman, negative posterior drawer, no pain or laxity with varus or valgus stress at 0 degrees and 30 degrees of flexion  neg joint line tenderness  5 out of 5 strength throughout distal muscle groups  Sensation is intact to light touch throughout all distributions  There is no calf swelling or tenderness  Palpable DP pulse, brisk cap refill, 2+ symmetric reflexes    Imaging:  X-rays of the left knee were reviewed. There is no fracture or dislocation. No other abnormality. MRI of the left knee was reviewed. There is degeneration of the body of the lateral meniscus. 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 We discussed operative and nonoperative management. At this point I do recommend nonoperative management.   Nonoperative treatment options include activity modification, anti-inflammatory medications, physical therapy, and injections. since she did not respond to the cortisone injection we have elected to proceed with hyaluronic acid. Therefore Synvisc 1 injection was given via sterile technique. She tolerated this well. She will return as needed. If she does remain symptomatic in the future we could consider further surgical intervention in the form of cartilage restoration surgery. Tian Jackson MD            Orthopaedic Surgery Sports Medicine and [address] [facility] and 102 [facility] Physician [facility] ([place]      Disclaimer: This note was dictated with voice recognition software. Though review and correction are routine, we apologize for any errors.

## 2021-10-14 ENCOUNTER — OFFICE VISIT (OUTPATIENT)
Dept: ORTHOPEDIC SURGERY | Age: 41
End: 2021-10-14
Payer: MEDICARE

## 2021-10-14 VITALS — WEIGHT: 248 LBS | HEIGHT: 68 IN | BODY MASS INDEX: 37.59 KG/M2

## 2021-10-14 DIAGNOSIS — Z01.818 PRE-OP TESTING: Primary | ICD-10-CM

## 2021-10-14 DIAGNOSIS — M23.8X2 CHONDRAL DEFECT OF LEFT PATELLA: ICD-10-CM

## 2021-10-14 PROCEDURE — G8417 CALC BMI ABV UP PARAM F/U: HCPCS | Performed by: ORTHOPAEDIC SURGERY

## 2021-10-14 PROCEDURE — G8484 FLU IMMUNIZE NO ADMIN: HCPCS | Performed by: ORTHOPAEDIC SURGERY

## 2021-10-14 PROCEDURE — G8427 DOCREV CUR MEDS BY ELIG CLIN: HCPCS | Performed by: ORTHOPAEDIC SURGERY

## 2021-10-14 PROCEDURE — 99215 OFFICE O/P EST HI 40 MIN: CPT | Performed by: ORTHOPAEDIC SURGERY

## 2021-10-14 PROCEDURE — 1036F TOBACCO NON-USER: CPT | Performed by: ORTHOPAEDIC SURGERY

## 2021-10-14 NOTE — PROGRESS NOTES
10/14/2021     Reason for visit:  Left knee pain    History of Present Illness: The patient is a 42-year-old female who presents for evaluation of her left knee. She presents a referral from my partner Dr. Greg Sauceda. She reports pain for the past 6-12 months. No traumatic injury. She localizes the pain to the anterior deep aspect of the knee. She occasionally experiences swelling. Occasional catching locking of the knee. The pain is made worse with standing, walking, stairs, bending, kneeling. She has been treated with activity modification, anti-inflammatory medications, physical therapy, cortisone injection, and hyaluronic acid injection. The hyaluronic acid injection was given approximately 6 weeks ago. She reports no relief with conservative measures. She has daily pain that affects her quality of life. Objective:  Ht 5' 8\" (1.727 m)   Wt 248 lb (112.5 kg)   BMI 37.71 kg/m²      Physical Exam:  The patient is well-appearing and in no apparent distress  Examination of the left knee   There is a small effusion, no gross deformity or skin changes  Range of motion reveals 0 to 130  neg lachman, negative posterior drawer, no pain or laxity with varus or valgus stress at 0 degrees and 30 degrees of flexion  neg joint line tenderness  5 out of 5 strength throughout distal muscle groups  Sensation is intact to light touch throughout all distributions  There is no calf swelling or tenderness  Palpable DP pulse, brisk cap refill, 2+ symmetric reflexes    Imaging:  X-rays of the left knee were reviewed. There is no fracture or dislocation. No other abnormality. MRI of the left knee was reviewed. There is degeneration of the body of the lateral meniscus. 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 Once again we spent time reviewing her imaging findings. She remains symptomatic despite extensive conservative management. From a surgical standpoint we could consider a left knee arthroscopy with possible chondroplasty and possible cartilage biopsy. It would allow us to further assess the extent of cartilage damage and also obtain a biopsy which could potentially be used for cartilage restoration surgery in the future. Alternative option would be a partial knee arthroplasty. Following our discussion she is elected proceed with the left knee arthroscopy with possible chondroplasty and possible cartilage biopsy. Risk were defined as not limited to infection, bleeding, damage to blood vessels or nerves, need for additional surgery. She does understand elects proceed. Greater than 60 minutes were spent with this encounter. Time spent included evaluating the patient's chart prior to arrival.  Evaluating the patient in the office including history, physical examination, imaging reviewing, and counseling on next steps. Lastly, time was spent discussing orders with my staff as well as providing documentation in the chart. Sherrie Chao MD            Orthopaedic Surgery Sports Medicine and [address] [facility] [place] and [address] [facility] Physician [facility] [place] ([address])      Disclaimer: This note was dictated with voice recognition software. Though review and correction are routine, we apologize for any errors.

## 2021-10-15 ENCOUNTER — TELEPHONE (OUTPATIENT)
Dept: ORTHOPEDIC SURGERY | Age: 41
End: 2021-10-15

## 2021-10-19 DIAGNOSIS — Z01.818 PRE-OP TESTING: ICD-10-CM

## 2021-10-19 LAB — SOURCE: NORMAL

## 2021-10-20 ENCOUNTER — TELEPHONE (OUTPATIENT)
Dept: ORTHOPEDIC SURGERY | Age: 41
End: 2021-10-20

## 2021-10-20 DIAGNOSIS — G89.18 POST-OP PAIN: Primary | ICD-10-CM

## 2021-10-20 LAB
HPV COMMENT: NORMAL
HPV TYPE 16: NOT DETECTED
HPV TYPE 18: NOT DETECTED
HPVOH (OTHER TYPES): NOT DETECTED
SARS-COV-2: NOT DETECTED

## 2021-10-20 NOTE — TELEPHONE ENCOUNTER
CPT: 07659, 73107  BODY PART: left knee  AUTHORIZATION: approved    Approved # [id number]  10/15/21-4/15/22

## 2021-10-20 NOTE — PROGRESS NOTES
you the first 24 hrs. Your surgery will be cancelled if you do not have a ride home. 8. A parent/legal guardian must accompany a child scheduled for surgery and plan to stay at the hospital until the child is discharged. Please do not bring other children with you. 9. Please wear simple, loose fitting clothing to the hospital.  Sandra Gore not bring valuables (money, credit cards, checkbooks, etc.) Do not wear any makeup (including no eye makeup) or nail polish on your fingers or toes. 10. DO NOT wear any jewelry or piercings on day of surgery. All body piercing jewelry must be removed. 11. If you have ___dentures, they will be removed before going to the OR; we will provide you a container. If you wear ___contact lenses or ___glasses, they will be removed; please bring a case for them. 12. Please see your family doctor/pediatrician for a history & physical and/or concerning medications. Bring any test results/reports from your physician's office. PCP__________________Phone___________H&P Appt. Date________             13 If you  have a Living Will and Durable Power of  for Healthcare, please bring in a copy. 15. Notify your Surgeon if you develop any illness between now and surgery  time, cough, cold, fever, sore throat, nausea, vomiting, etc.  Please notify your surgeon if you experience dizziness, shortness of breath or blurred vision between now & the time of your surgery             15. DO NOT shave your operative site 96 hours prior to surgery. For face & neck surgery, men may use an electric razor 48 hours prior to surgery. 16. Shower the night before or morning of surgery using an antibacterial soap or as you have been instructed. 17. To provide excellent care visitors will be limited to one in the room at any given time. 18.  Please bring picture ID and insurance card.              19.  Visit our web site for additional information:  [email or web address]              20.During flu season no children under the age of 15 are permitted in the hospital for the safety of all patients. 21. If you take a long acting insulin in the evening only  take half of your usual  dose the night  before your procedure              22. If you use a c-pap please bring DOS if staying overnight,             23.For your convenience [facility] has a pharmacy on site to fill your prescriptions. 24. If you use oxygen and have a portable tank please bring it  with you the DOS             25. Bring a complete list of all your medications with name and dose include any supplements. 26. Other__________________________________________   *Please call pre admission testing if you any further questions   [address] [place]  [phone]   [facility] [address] REYNA PRESCOTT   [phone]           COVID TESTING    __x_ Covid test to be done 3-5 days prior to scheduled surgery -patient aware they are REQUIRED to bring a copy of the negative result DOS-if they receive a positive result to notify their surgeon         If known - indicate where patient is getting covid test done __[facility] 10/19/2021_________________________________________________________    ___ Rapid - DOS    ___ Other___________________________________      Katie Graft POLICY(subject to change)    There is a one visitor policy at [facility] for all surgeries and endoscopies. Whether the visitor can stay or will be asked to wait in the car will depend on the current policy and if social distancing can be maintained. The policy is subject to change at any time. Please make sure the visitor has a cell phone that is on,charged and able to accept calls, as this may be the way that the staff communicates with them. Pain management is NO VISITOR policyThe patients ride is expected to remain in the car with a cell phone for communication. If the ride is leaving the hospital grounds please make sure they are back in time for pickup. Have the patient inform the staff on arrival what their rides plans are while the patient is in the facility. At the MAIN there is one visitor allowed. Please note that the visitor policy is subject to change. All above information reviewed with patient in person or by phone. Patient verbalizes understanding. All questions and concerns addressed.                                                                                                  Patient/Rep_patient___________________                                                                                                                                    PRE OP INSTRUCTIONS

## 2021-10-21 ENCOUNTER — ANESTHESIA EVENT (OUTPATIENT)
Dept: OPERATING ROOM | Age: 41
End: 2021-10-21
Payer: MEDICARE

## 2021-10-21 RX ORDER — TRAMADOL HYDROCHLORIDE 50 MG/1
50 TABLET ORAL EVERY 4 HOURS PRN
Qty: 30 TABLET | Refills: 0 | Status: SHIPPED | OUTPATIENT
Start: 2021-10-21 | End: 2021-10-28

## 2021-10-21 RX ORDER — ASPIRIN 325 MG
325 TABLET, DELAYED RELEASE (ENTERIC COATED) ORAL DAILY
Qty: 14 TABLET | Refills: 0 | Status: ON HOLD | OUTPATIENT
Start: 2021-10-21 | End: 2021-10-22

## 2021-10-21 RX ORDER — ONDANSETRON 4 MG/1
4 TABLET, FILM COATED ORAL EVERY 8 HOURS PRN
Qty: 21 TABLET | Refills: 0 | Status: SHIPPED | OUTPATIENT
Start: 2021-10-21 | End: 2021-10-28

## 2021-10-22 ENCOUNTER — ANESTHESIA (OUTPATIENT)
Dept: OPERATING ROOM | Age: 41
End: 2021-10-22
Payer: MEDICARE

## 2021-10-22 ENCOUNTER — HOSPITAL ENCOUNTER (OUTPATIENT)
Age: 41
Setting detail: OUTPATIENT SURGERY
Discharge: HOME | End: 2021-10-22
Attending: ORTHOPAEDIC SURGERY | Admitting: ORTHOPAEDIC SURGERY
Payer: MEDICARE

## 2021-10-22 VITALS
BODY MASS INDEX: 37.44 KG/M2 | WEIGHT: 247 LBS | HEART RATE: 58 BPM | OXYGEN SATURATION: 97 % | SYSTOLIC BLOOD PRESSURE: 101 MMHG | HEIGHT: 68 IN | TEMPERATURE: 96.9 F | DIASTOLIC BLOOD PRESSURE: 75 MMHG | RESPIRATION RATE: 18 BRPM

## 2021-10-22 VITALS
OXYGEN SATURATION: 100 % | DIASTOLIC BLOOD PRESSURE: 63 MMHG | SYSTOLIC BLOOD PRESSURE: 138 MMHG | RESPIRATION RATE: 13 BRPM | TEMPERATURE: 97 F

## 2021-10-22 LAB — HCG(URINE) PREGNANCY TEST: NEGATIVE

## 2021-10-22 PROCEDURE — 2500000003 HC RX 250 WO HCPCS: Performed by: NURSE ANESTHETIST, CERTIFIED REGISTERED

## 2021-10-22 PROCEDURE — 3600000004 HC SURGERY LEVEL 4 BASE: Performed by: ORTHOPAEDIC SURGERY

## 2021-10-22 PROCEDURE — 6360000002 HC RX W HCPCS: Performed by: ORTHOPAEDIC SURGERY

## 2021-10-22 PROCEDURE — 3600000014 HC SURGERY LEVEL 4 ADDTL 15MIN: Performed by: ORTHOPAEDIC SURGERY

## 2021-10-22 PROCEDURE — 7100000000 HC PACU RECOVERY - FIRST 15 MIN: Performed by: ORTHOPAEDIC SURGERY

## 2021-10-22 PROCEDURE — 3700000001 HC ADD 15 MINUTES (ANESTHESIA): Performed by: ORTHOPAEDIC SURGERY

## 2021-10-22 PROCEDURE — 3700000000 HC ANESTHESIA ATTENDED CARE: Performed by: ORTHOPAEDIC SURGERY

## 2021-10-22 PROCEDURE — 7100000001 HC PACU RECOVERY - ADDTL 15 MIN: Performed by: ORTHOPAEDIC SURGERY

## 2021-10-22 PROCEDURE — 6360000002 HC RX W HCPCS: Performed by: NURSE ANESTHETIST, CERTIFIED REGISTERED

## 2021-10-22 PROCEDURE — 84703 CHORIONIC GONADOTROPIN ASSAY: CPT

## 2021-10-22 PROCEDURE — 2500000003 HC RX 250 WO HCPCS: Performed by: ORTHOPAEDIC SURGERY

## 2021-10-22 PROCEDURE — 2580000003 HC RX 258: Performed by: NURSE ANESTHETIST, CERTIFIED REGISTERED

## 2021-10-22 PROCEDURE — 2580000003 HC RX 258: Performed by: ORTHOPAEDIC SURGERY

## 2021-10-22 PROCEDURE — 7100000010 HC PHASE II RECOVERY - FIRST 15 MIN: Performed by: ORTHOPAEDIC SURGERY

## 2021-10-22 PROCEDURE — 2709999900 HC NON-CHARGEABLE SUPPLY: Performed by: ORTHOPAEDIC SURGERY

## 2021-10-22 PROCEDURE — 7100000011 HC PHASE II RECOVERY - ADDTL 15 MIN: Performed by: ORTHOPAEDIC SURGERY

## 2021-10-22 RX ORDER — PROPOFOL 10 MG/ML
INJECTION, EMULSION INTRAVENOUS PRN
Status: DISCONTINUED | OUTPATIENT
Start: 2021-10-22 | End: 2021-10-22 | Stop reason: SDUPTHER

## 2021-10-22 RX ORDER — HYDRALAZINE HYDROCHLORIDE 20 MG/ML
5 INJECTION INTRAMUSCULAR; INTRAVENOUS EVERY 10 MIN PRN
Status: DISCONTINUED | OUTPATIENT
Start: 2021-10-22 | End: 2021-10-22 | Stop reason: HOSPADM

## 2021-10-22 RX ORDER — SODIUM CHLORIDE 0.9 % (FLUSH) 0.9 %
10 SYRINGE (ML) INJECTION EVERY 12 HOURS SCHEDULED
Status: CANCELLED | OUTPATIENT
Start: 2021-10-22

## 2021-10-22 RX ORDER — ONDANSETRON 2 MG/ML
INJECTION INTRAMUSCULAR; INTRAVENOUS PRN
Status: DISCONTINUED | OUTPATIENT
Start: 2021-10-22 | End: 2021-10-22 | Stop reason: SDUPTHER

## 2021-10-22 RX ORDER — DEXAMETHASONE SODIUM PHOSPHATE 4 MG/ML
INJECTION, SOLUTION INTRA-ARTICULAR; INTRALESIONAL; INTRAMUSCULAR; INTRAVENOUS; SOFT TISSUE PRN
Status: DISCONTINUED | OUTPATIENT
Start: 2021-10-22 | End: 2021-10-22 | Stop reason: SDUPTHER

## 2021-10-22 RX ORDER — LIDOCAINE HYDROCHLORIDE 20 MG/ML
INJECTION, SOLUTION EPIDURAL; INFILTRATION; INTRACAUDAL; PERINEURAL PRN
Status: DISCONTINUED | OUTPATIENT
Start: 2021-10-22 | End: 2021-10-22 | Stop reason: SDUPTHER

## 2021-10-22 RX ORDER — MAGNESIUM SULFATE HEPTAHYDRATE 500 MG/ML
INJECTION, SOLUTION INTRAMUSCULAR; INTRAVENOUS PRN
Status: DISCONTINUED | OUTPATIENT
Start: 2021-10-22 | End: 2021-10-22 | Stop reason: SDUPTHER

## 2021-10-22 RX ORDER — PROCHLORPERAZINE EDISYLATE 5 MG/ML
5 INJECTION INTRAMUSCULAR; INTRAVENOUS
Status: DISCONTINUED | OUTPATIENT
Start: 2021-10-22 | End: 2021-10-22 | Stop reason: HOSPADM

## 2021-10-22 RX ORDER — SODIUM CHLORIDE, SODIUM LACTATE, POTASSIUM CHLORIDE, CALCIUM CHLORIDE 600; 310; 30; 20 MG/100ML; MG/100ML; MG/100ML; MG/100ML
INJECTION, SOLUTION INTRAVENOUS CONTINUOUS PRN
Status: DISCONTINUED | OUTPATIENT
Start: 2021-10-22 | End: 2021-10-22 | Stop reason: SDUPTHER

## 2021-10-22 RX ORDER — FENTANYL CITRATE 50 UG/ML
25 INJECTION, SOLUTION INTRAMUSCULAR; INTRAVENOUS EVERY 5 MIN PRN
Status: DISCONTINUED | OUTPATIENT
Start: 2021-10-22 | End: 2021-10-22 | Stop reason: HOSPADM

## 2021-10-22 RX ORDER — LABETALOL HYDROCHLORIDE 5 MG/ML
5 INJECTION, SOLUTION INTRAVENOUS EVERY 10 MIN PRN
Status: DISCONTINUED | OUTPATIENT
Start: 2021-10-22 | End: 2021-10-22 | Stop reason: HOSPADM

## 2021-10-22 RX ORDER — LIDOCAINE HYDROCHLORIDE 10 MG/ML
0.5 INJECTION, SOLUTION EPIDURAL; INFILTRATION; INTRACAUDAL; PERINEURAL ONCE
Status: DISCONTINUED | OUTPATIENT
Start: 2021-10-22 | End: 2021-10-22 | Stop reason: HOSPADM

## 2021-10-22 RX ORDER — SODIUM CHLORIDE 0.9 % (FLUSH) 0.9 %
10 SYRINGE (ML) INJECTION PRN
Status: CANCELLED | OUTPATIENT
Start: 2021-10-22

## 2021-10-22 RX ORDER — SODIUM CHLORIDE, SODIUM LACTATE, POTASSIUM CHLORIDE, CALCIUM CHLORIDE 600; 310; 30; 20 MG/100ML; MG/100ML; MG/100ML; MG/100ML
INJECTION, SOLUTION INTRAVENOUS CONTINUOUS
Status: DISCONTINUED | OUTPATIENT
Start: 2021-10-22 | End: 2021-10-22 | Stop reason: HOSPADM

## 2021-10-22 RX ORDER — ONDANSETRON 2 MG/ML
4 INJECTION INTRAMUSCULAR; INTRAVENOUS
Status: DISCONTINUED | OUTPATIENT
Start: 2021-10-22 | End: 2021-10-22 | Stop reason: HOSPADM

## 2021-10-22 RX ORDER — SODIUM CHLORIDE, SODIUM LACTATE, POTASSIUM CHLORIDE, CALCIUM CHLORIDE 600; 310; 30; 20 MG/100ML; MG/100ML; MG/100ML; MG/100ML
INJECTION, SOLUTION INTRAVENOUS CONTINUOUS
Status: CANCELLED | OUTPATIENT
Start: 2021-10-22

## 2021-10-22 RX ORDER — LIDOCAINE HYDROCHLORIDE 10 MG/ML
1 INJECTION, SOLUTION EPIDURAL; INFILTRATION; INTRACAUDAL; PERINEURAL
Status: CANCELLED | OUTPATIENT
Start: 2021-10-22 | End: 2021-10-22

## 2021-10-22 RX ORDER — SODIUM CHLORIDE 9 MG/ML
25 INJECTION, SOLUTION INTRAVENOUS PRN
Status: CANCELLED | OUTPATIENT
Start: 2021-10-22

## 2021-10-22 RX ORDER — HYDROCODONE BITARTRATE AND ACETAMINOPHEN 5; 325 MG/1; MG/1
1 TABLET ORAL
Status: DISCONTINUED | OUTPATIENT
Start: 2021-10-22 | End: 2021-10-22 | Stop reason: HOSPADM

## 2021-10-22 RX ORDER — MIDAZOLAM HYDROCHLORIDE 1 MG/ML
INJECTION INTRAMUSCULAR; INTRAVENOUS PRN
Status: DISCONTINUED | OUTPATIENT
Start: 2021-10-22 | End: 2021-10-22 | Stop reason: SDUPTHER

## 2021-10-22 RX ORDER — FENTANYL CITRATE 50 UG/ML
INJECTION, SOLUTION INTRAMUSCULAR; INTRAVENOUS PRN
Status: DISCONTINUED | OUTPATIENT
Start: 2021-10-22 | End: 2021-10-22 | Stop reason: SDUPTHER

## 2021-10-22 RX ORDER — HYDROMORPHONE HCL 110MG/55ML
0.5 PATIENT CONTROLLED ANALGESIA SYRINGE INTRAVENOUS EVERY 5 MIN PRN
Status: DISCONTINUED | OUTPATIENT
Start: 2021-10-22 | End: 2021-10-22 | Stop reason: HOSPADM

## 2021-10-22 RX ORDER — BUPIVACAINE HYDROCHLORIDE 5 MG/ML
INJECTION, SOLUTION EPIDURAL; INTRACAUDAL
Status: COMPLETED | OUTPATIENT
Start: 2021-10-22 | End: 2021-10-22

## 2021-10-22 RX ADMIN — MAGNESIUM SULFATE HEPTAHYDRATE 1 G: 500 INJECTION, SOLUTION INTRAMUSCULAR; INTRAVENOUS at 07:55

## 2021-10-22 RX ADMIN — DEXAMETHASONE SODIUM PHOSPHATE 8 MG: 4 INJECTION, SOLUTION INTRAMUSCULAR; INTRAVENOUS at 07:55

## 2021-10-22 RX ADMIN — MIDAZOLAM 2 MG: 1 INJECTION INTRAMUSCULAR; INTRAVENOUS at 07:33

## 2021-10-22 RX ADMIN — CEFAZOLIN 2000 MG: 10 INJECTION, POWDER, FOR SOLUTION INTRAVENOUS at 07:32

## 2021-10-22 RX ADMIN — FENTANYL CITRATE 50 MCG: 50 INJECTION, SOLUTION INTRAMUSCULAR; INTRAVENOUS at 07:38

## 2021-10-22 RX ADMIN — LIDOCAINE HYDROCHLORIDE 100 MG: 20 INJECTION, SOLUTION EPIDURAL; INFILTRATION; INTRACAUDAL; PERINEURAL at 07:38

## 2021-10-22 RX ADMIN — PROPOFOL 200 MG: 10 INJECTION, EMULSION INTRAVENOUS at 07:38

## 2021-10-22 RX ADMIN — SODIUM CHLORIDE, POTASSIUM CHLORIDE, SODIUM LACTATE AND CALCIUM CHLORIDE: 600; 310; 30; 20 INJECTION, SOLUTION INTRAVENOUS at 07:33

## 2021-10-22 RX ADMIN — ONDANSETRON 4 MG: 2 INJECTION INTRAMUSCULAR; INTRAVENOUS at 07:55

## 2021-10-22 RX ADMIN — FENTANYL CITRATE 50 MCG: 50 INJECTION, SOLUTION INTRAMUSCULAR; INTRAVENOUS at 07:50

## 2021-10-22 RX ADMIN — SODIUM CHLORIDE, POTASSIUM CHLORIDE, SODIUM LACTATE AND CALCIUM CHLORIDE: 600; 310; 30; 20 INJECTION, SOLUTION INTRAVENOUS at 06:56

## 2021-10-22 ASSESSMENT — PULMONARY FUNCTION TESTS
PIF_VALUE: 19
PIF_VALUE: 2
PIF_VALUE: 0
PIF_VALUE: 2
PIF_VALUE: 1
PIF_VALUE: 22
PIF_VALUE: 17
PIF_VALUE: 20
PIF_VALUE: 20
PIF_VALUE: 2
PIF_VALUE: 22
PIF_VALUE: 18
PIF_VALUE: 22
PIF_VALUE: 3
PIF_VALUE: 14
PIF_VALUE: 2
PIF_VALUE: 0
PIF_VALUE: 2
PIF_VALUE: 2
PIF_VALUE: 22
PIF_VALUE: 23
PIF_VALUE: 19
PIF_VALUE: 2
PIF_VALUE: 19
PIF_VALUE: 0
PIF_VALUE: 20
PIF_VALUE: 2
PIF_VALUE: 3
PIF_VALUE: 19
PIF_VALUE: 20
PIF_VALUE: 0
PIF_VALUE: 2
PIF_VALUE: 17
PIF_VALUE: 22
PIF_VALUE: 20
PIF_VALUE: 20
PIF_VALUE: 1
PIF_VALUE: 20
PIF_VALUE: 20

## 2021-10-22 ASSESSMENT — PAIN SCALES - GENERAL
PAINLEVEL_OUTOF10: 0

## 2021-10-22 ASSESSMENT — ENCOUNTER SYMPTOMS: SHORTNESS OF BREATH: 0

## 2021-10-22 ASSESSMENT — PAIN - FUNCTIONAL ASSESSMENT
PAIN_FUNCTIONAL_ASSESSMENT: 0-10
PAIN_FUNCTIONAL_ASSESSMENT: PREVENTS OR INTERFERES SOME ACTIVE ACTIVITIES AND ADLS

## 2021-10-22 ASSESSMENT — PAIN DESCRIPTION - DESCRIPTORS: DESCRIPTORS: ACHING

## 2021-10-22 NOTE — PROGRESS NOTES
Received in PACU Phase 1 from OR. Alert and drowsy. Responds to verbal command. Respirations easy. O2 continued at 4L/simple mask. Left knee ace wrap clean, dry and intact. Left leg up on pillow. Ice pack applied. Able to wiggle toes and feel touch. Pedal pulse and posterior tibial pulse moderate. Left foot warm to touch. Carlitos any pain or discomfort. VSS.

## 2021-10-22 NOTE — PROGRESS NOTES
Alert. Doing well. VSS. Respirations easy on room air. Denies any pain or nausea. Taking ice chips and fluids without difficulty. Moved to PACU Phase 2 Recovery.

## 2021-10-22 NOTE — ANESTHESIA POSTPROCEDURE EVALUATION
Department of Anesthesiology  Postprocedure Note    Patient: Lidya Oliva  MRN: [id number]  YOB: 1980  Date of evaluation: 10/22/2021  Time:  10:57 AM     Procedure Summary     Date: 10/22/21 Room / Location: [address]    Anesthesia Start: 9840 Anesthesia Stop: 4523    Procedure: LEFT KNEE ARTHROSCOPY; CHONDROPLASTY;  CARTILAGE BIOPSY (53069, 20710) (Left Knee) Diagnosis: (M23.8X2  LEFT KNEE PATELLAR CHONDRAL DEFECT)    Surgeons: Vamshi Dutta MD Responsible Provider: Mendy Harrell MD    Anesthesia Type: general ASA Status: 2          Anesthesia Type: general    Martín Phase I: Martín Score: 9    Martín Phase II: Martín Score: 10    Last vitals: Reviewed and per EMR flowsheets.        Anesthesia Post Evaluation    Patient location during evaluation: PACU  Patient participation: complete - patient participated  Level of consciousness: awake and alert  Airway patency: patent  Nausea & Vomiting: no nausea and no vomiting  Complications: no  Cardiovascular status: hemodynamically stable  Respiratory status: acceptable  Hydration status: stable  Multimodal analgesia pain management approach

## 2021-10-22 NOTE — ANESTHESIA PRE PROCEDURE
Department of Anesthesiology  Preprocedure Note       Name:  Chris Santoro   Age:  39 y.o.  :  1980                                          MRN:  [id number]         Date:  10/22/2021      Surgeon: Roshan Leon):  Sri Soto MD    Procedure: Procedure(s):  LEFT KNEE ARTHROSCOPY; POSSIBLE CHONDROPLASTY; POSSIBLE CARTILAGE BIOPSY (32724, 78704)    Medications prior to admission:   Prior to Admission medications    Medication Sig Start Date End Date Taking? Authorizing Provider   amphetamine-dextroamphetamine (ADDERALL XR) 30 MG extended release capsule TAKE 1 CAPSULE BY MOUTH 1 TIME A DAY IN THE MORNING 21  Yes Historical Provider, MD   FLUoxetine (PROZAC) 20 MG capsule Take 40 mg by mouth daily    Yes Historical Provider, MD   traMADol (ULTRAM) 50 MG tablet Take 1 tablet by mouth every 4 hours as needed for Pain for up to 30 doses. Intended supply: 7 days. Take lowest dose possible to manage pain 10/21/21 10/28/21  Sri Soto MD   aspirin 325 MG EC tablet Take 1 tablet by mouth daily for 14 days 10/21/21 11/4/21  Sri Soto MD   ondansetron [facility]) 4 MG tablet Take 1 tablet by mouth every 8 hours as needed for Nausea or Vomiting 10/21/21 10/28/21  Sri Soto MD   traMADol (ULTRAM) 50 MG tablet Take 50 mg by mouth every 6 hours as needed for Pain.     Historical Provider, MD   ibuprofen (ADVIL;MOTRIN) 200 MG tablet Take 200 mg by mouth every 6 hours as needed for Pain    Historical Provider, MD       Current medications:    Current Facility-Administered Medications   Medication Dose Route Frequency Provider Last Rate Last Admin    lactated ringers infusion   IntraVENous Continuous Sri Soto MD        lidocaine PF 1 % injection 0.5 mL  0.5 mL IntraDERmal Once Sri Soto MD        ceFAZolin (ANCEF) 2000 mg in dextrose 5 % 50 mL IVPB  2,000 mg IntraVENous On Call to [address] Tonny Carter MD        ortho mix injection   Injection On Call Sri Soto MD        tranexamic acid (CYKLOKAPRON) 1,000 mg in sodium chloride 0.9 % 50 mL IVPB  1,000 mg IntraVENous Once Joyce Montes MD           Allergies: Allergies   Allergen Reactions    Demerol      UNKNOWN    Monistat [Tioconazole] Itching     Burning, blisters    Pineapple Itching       Problem List:    Patient Active Problem List   Diagnosis Code    Injury of ankle, right S99.911A    Fracture, fibula S82.409A    Chest pain R07.9    Threatened labor O47.9    Poor fetal growth affecting management of mother in third trimester O45.26    Normal labor O80, Z37.9    Poor fetal growth affecting management of mother in third trimester O45.26    Normal vaginal delivery O80    Vaginal delivery O80    Trigger ring finger of left hand M65.342       Past Medical History:        Diagnosis Date    Abnormal Pap smear of cervix     in past, recent normal    Anemia     Cervix dysplasia     Depression     no meds currently    Kidney stones     kidney stones    Postpartum depression     Zoloft helped       Past Surgical History:        Procedure Laterality Date    CHOLECYSTECTOMY      FINGER TRIGGER RELEASE Left 12/8/2020    LEFT RING FINGER TRIGGER FINGER RELEASE performed by Darrel Lenz MD at [address] [place] [address] Bilateral     TONSILLECTOMY         Social History:    Social History     Tobacco Use    Smoking status: Never Smoker    Smokeless tobacco: Never Used   Substance Use Topics    Alcohol use:  No                                Counseling given: Not Answered      Vital Signs (Current):   Vitals:    10/20/21 1218   Weight: 242 lb 11.2 oz (110.1 kg)   Height: 5' 8.5\" (1.74 m)                                              BP Readings from Last 3 Encounters:   05/24/21 120/78   12/08/20 114/66   12/08/20 122/85       NPO Status:                                                                                 BMI:   Wt Readings from Last 3 Encounters:   10/20/21 242 lb 11.2 oz (110.1 kg)   10/14/21 248 lb (112.5 kg) 08/30/21 248 lb (112.5 kg)     Body mass index is 36.37 kg/m². CBC:   Lab Results   Component Value Date    WBC 7.6 11/02/2020    RBC 4.63 11/02/2020    HGB 11.9 11/02/2020    HCT 36.6 11/02/2020    MCV 79.1 11/02/2020    RDW 15.3 11/02/2020     11/02/2020       CMP:   Lab Results   Component Value Date     11/02/2020    K 4.0 11/02/2020     11/02/2020    CO2 25 11/02/2020    BUN 6 11/02/2020    CREATININE 0.7 11/02/2020    GFRAA >60 11/02/2020    GFRAA >60 03/12/2012    AGRATIO 1.4 11/02/2020    LABGLOM >60 11/02/2020    GLUCOSE 85 11/02/2020    PROT 6.9 11/02/2020    PROT 6.7 11/11/2010    CALCIUM 9.0 11/02/2020    BILITOT <0.2 11/02/2020    ALKPHOS 75 11/02/2020    AST 15 11/02/2020    ALT 16 11/02/2020       POC Tests: No results for input(s): POCGLU, POCNA, POCK, POCCL, POCBUN, POCHEMO, POCHCT in the last 72 hours.     Coags: No results found for: PROTIME, INR, APTT    HCG (If Applicable):   Lab Results   Component Value Date    PREGTESTUR Negative 12/08/2020        ABGs: No results found for: PHART, PO2ART, XAL8HZH, UIH4EQM, BEART, X1FKGVJF     Type & Screen (If Applicable):  No results found for: LABABO, LABRH    Drug/Infectious Status (If Applicable):  No results found for: HIV, HEPCAB    COVID-19 Screening (If Applicable):   Lab Results   Component Value Date    COVID19 Not Detected 10/19/2021    COVID19 NOT DETECTED 12/02/2020           Anesthesia Evaluation  Patient summary reviewed and Nursing notes reviewed no history of anesthetic complications:   Airway: Mallampati: I  TM distance: >3 FB   Neck ROM: full  Mouth opening: > = 3 FB Dental: normal exam         Pulmonary:       (-) asthma and shortness of breath                           Cardiovascular:        (-) hypertension and  angina                Neuro/Psych:   (+) depression/anxiety    (-) CVA           GI/Hepatic/Renal:        (-) GERD and liver disease       Endo/Other:        (-) diabetes mellitus, hypothyroidism               Abdominal:             Vascular:     - PVD. Other Findings:           Anesthesia Plan      general     ASA 2       Induction: intravenous. MIPS: Postoperative opioids intended and Prophylactic antiemetics administered. Anesthetic plan and risks discussed with patient. Use of blood products discussed with patient whom. Plan discussed with CRNA.                   Jl Diamond MD   10/22/2021

## 2021-10-22 NOTE — PROGRESS NOTES
CLINICAL PHARMACY NOTE: MEDS TO BEDS    Total # of Prescriptions Filled: 3   The following medications were delivered to the patient:  · Zofran 4 mg  · Tramadol 50 mg  · Aspirin 325 mg    Additional Documentation:    Delivered to Patient son=signed  Garry Galindo CPhT

## 2021-10-22 NOTE — H&P
History of Present Illness: The patient is a 75-year-old female who presents for evaluation of her left knee. She presents a referral from my partner Dr. Niya Madden. She reports pain for the past 6-12 months. No traumatic injury. She localizes the pain to the anterior deep aspect of the knee. She occasionally experiences swelling. Occasional catching locking of the knee. The pain is made worse with standing, walking, stairs, bending, kneeling.      She has been treated with activity modification, anti-inflammatory medications, physical therapy, cortisone injection, and hyaluronic acid injection. The hyaluronic acid injection was given approximately 6 weeks ago. She reports no relief with conservative measures. She has daily pain that affects her quality of life.     Past Medical History:   Diagnosis Date    Abnormal Pap smear of cervix     in past, recent normal    Anemia     Cervix dysplasia     Depression     no meds currently    Kidney stones     kidney stones    Postpartum depression     Zoloft helped     Past Surgical History:   Procedure Laterality Date    CHOLECYSTECTOMY      FINGER TRIGGER RELEASE Left 12/8/2020    LEFT RING FINGER TRIGGER FINGER RELEASE performed by Aline Bustamante MD at [address] [place] [address] Bilateral     TONSILLECTOMY       Allergies   Allergen Reactions    Demerol      UNKNOWN    Monistat [Tioconazole] Itching     Burning, blisters    Pineapple Itching       Scheduled Meds:   lidocaine PF  0.5 mL IntraDERmal Once    ceFAZolin  2,000 mg IntraVENous On Call to [address] [place] [address] ortho mix injection   Injection On Call    tranexamic acid (CYCLOKAPRON) IVPB  1,000 mg IntraVENous Once     Continuous Infusions:   lactated ringers 50 mL/hr at 10/22/21 0656     PRN Meds:.fentanNYL, HYDROmorphone, HYDROcodone 5 mg - acetaminophen, ondansetron, prochlorperazine, labetalol, hydrALAZINE     Objective:  Ht 5' 8\" (1.727 m)   Wt 248 lb (112.5 kg)   BMI 37.71 kg/m²       Physical Exam:  The patient is well-appearing and in no apparent distress  CV-RRR  Pulm-CTAB    Examination of the left knee   There is a small effusion, no gross deformity or skin changes  Range of motion reveals 0 to 130  neg lachman, negative posterior drawer, no pain or laxity with varus or valgus stress at 0 degrees and 30 degrees of flexion  neg joint line tenderness  5 out of 5 strength throughout distal muscle groups  Sensation is intact to light touch throughout all distributions  There is no calf swelling or tenderness  Palpable DP pulse, brisk cap refill, 2+ symmetric reflexes     Imaging:  X-rays of the left knee were reviewed. There is no fracture or dislocation. No other abnormality. MRI of the left knee was reviewed. There is degeneration of the body of the lateral meniscus. Patellar chondral fissuring and degeneration is present     Assessment:  Left knee pain with MRI evidence of patellar chondral defect     Plan:  I had a long discussion with the patient. Once again we spent time reviewing her imaging findings. She remains symptomatic despite extensive conservative management. From a surgical standpoint we could consider a left knee arthroscopy with possible chondroplasty and possible cartilage biopsy. It would allow us to further assess the extent of cartilage damage and also obtain a biopsy which could potentially be used for cartilage restoration surgery in the future. Alternative option would be a partial knee arthroplasty. Following our discussion she is elected proceed with the left knee arthroscopy with possible chondroplasty and possible cartilage biopsy. Risk were defined as not limited to infection, bleeding, damage to blood vessels or nerves, need for additional surgery.   She does understand elects proceed.

## 2021-10-22 NOTE — OP NOTE
Operative Note      Patient: Keegan Spencer  YOB: 1980  MRN: [id number]    Date of Procedure: 10/22/2021    Pre-Op Diagnosis: M23.8X2  LEFT KNEE PATELLAR CHONDRAL DEFECT    Post-Op Diagnosis: Same       Procedure(s):  LEFT KNEE ARTHROSCOPY; CHONDROPLASTY;  CARTILAGE BIOPSY ([address] [place] [address], [address])    Surgeon(s):  Karina Cadet MD    Assistant:   First Assistant: Jazmín Ibanez    Anesthesia: General    Estimated Blood Loss (mL): Minimal    Complications: None    Specimens:   * No specimens in log *    Implants:  * No implants in log *      Drains: * No LDAs found *    Findings: per dictations    Detailed Description of Procedure: The patient is a 59-year-old female who has had persistent left knee pain for several years. Recent worsening. The pain is anterior and deep. Is made worse with daily activities. She is difficulty with standing, walking, stairs. She reports occasional catching locking. Occasional swelling. She is been treated over the years with knee arthroscopy, anti-inflammatory medications, activity modification, injections, and physical therapy but is remained symptomatic. On exam she has small effusion full range of motion no ligamentous instability. She does have x-rays that are normal as well as an MRI that demonstrates what appears to be a large chondral defect of the patella. We discussed continue nonoperative management versus surgical intervention. From a surgical standpoint we discussed knee arthroscopy with cartilage biopsy and possible chondroplasty. Risk and benefits were thoroughly discussed. She does understand elects proceed.     The patient was met in the preoperative holding area.  I surgical site was identified marked.  Informed consent was obtained. Pillo Shin was taken back to the operative room placed on table supine position.  General anesthesia was performed.    Thigh tourniquet was placed.  Lower extremity was prepped and draped using sterile technique.

## 2021-10-22 NOTE — PROGRESS NOTES
Crutches available. Dressed and discharged to home with son. Has instructions, prescriptions, crutches and all belongings.

## 2021-11-04 ENCOUNTER — OFFICE VISIT (OUTPATIENT)
Dept: ORTHOPEDIC SURGERY | Age: 41
End: 2021-11-04

## 2021-11-04 VITALS — BODY MASS INDEX: 37.44 KG/M2 | HEIGHT: 68 IN | WEIGHT: 247 LBS

## 2021-11-04 DIAGNOSIS — M25.562 LEFT KNEE PAIN, UNSPECIFIED CHRONICITY: Primary | ICD-10-CM

## 2021-11-04 PROCEDURE — 99024 POSTOP FOLLOW-UP VISIT: CPT | Performed by: ORTHOPAEDIC SURGERY

## 2021-11-10 ENCOUNTER — HOSPITAL ENCOUNTER (OUTPATIENT)
Dept: PHYSICAL THERAPY | Age: 41
Setting detail: THERAPIES SERIES
Discharge: HOME | End: 2021-11-10
Payer: MEDICARE

## 2021-12-01 ENCOUNTER — HOSPITAL ENCOUNTER (OUTPATIENT)
Dept: PHYSICAL THERAPY | Age: 41
Setting detail: THERAPIES SERIES
Discharge: HOME | End: 2021-12-01
Payer: MEDICARE

## 2021-12-01 PROCEDURE — 97110 THERAPEUTIC EXERCISES: CPT

## 2021-12-01 PROCEDURE — 97161 PT EVAL LOW COMPLEX 20 MIN: CPT

## 2021-12-01 NOTE — FLOWSHEET NOTE
[address], [facility]    Physical Therapy Treatment Note/ Progress Report:     Date:  2021    Patient Name:  Nicola Canela    :  1980  MRN: [id number]  Medical/Treatment Diagnosis Information:  · Diagnosis: M25.562 (ICD-10-CM) - Left knee pain, unspecified chronicity  · Treatment Diagnosis: L knee pain and dysfunction  Insurance/Certification information:  PT Insurance Information: [place]  Physician Information:  Referring Practitioner: Rasheeda Rothman MD  Plan of care signed (Y/N):     Date of Patient follow up with Physician:      Progress Report: []  Yes  []  No     Functional Scale: LEFS:    Date:  21    Date Range for reporting period:  Beginnin21  Ending:      Progress report due (10 Rx/or 30 days whichever is less): 33     Recertification due (POC duration/ or 90 days whichever is less): 1/15/21     Visit # Insurance Allowable Auth Needed    []Yes    []No     Pain level:  -10/10     SUBJECTIVE:  See eval    OBJECTIVE: See eval   Observation:    Test measurements:      RESTRICTIONS/PRECAUTIONS: squatting, lifting, standing, walking 20 min, kneeling, ascending/descending stairs    Exercises/Interventions:     Therapeutic Ex 15' Resistance Sets/sec Reps Notes          Wall sit  3 To fatgiue    SL adbcution  2 10    squat tap at chair   1 20    bridges  1 10 5 sec                                                      Therapeutic Activities                                                               Manual Intervention       Knee mobs/PROM       Tib/Fem Mobs       Patella Mobs       Ankle mobs                     NMR re-education                                                                          Therapeutic Exercise and NMR EXR  [x] (07837) Provided verbal/tactile cueing for activities related to strengthening, flexibility, endurance, ROM for improvements in LE, proximal hip, and core control with self care, mobility, lifting, ambulation.  [] (69411) Provided verbal/tactile cueing for activities related to improving balance, coordination, kinesthetic sense, posture, motor skill, proprioception  to assist with LE, proximal hip, and core control in self care, mobility, lifting, ambulation and eccentric single leg control. NMR and Therapeutic Activities:    [] (01838 or 69637) Provided verbal/tactile cueing for activities related to improving balance, coordination, kinesthetic sense, posture, motor skill, proprioception and motor activation to allow for proper function of core, proximal hip and LE with self care and ADLs  [] (04610) Gait Re-education- Provided training and instruction to the patient for proper LE, core and proximal hip recruitment and positioning and eccentric body weight control with ambulation re-education including up and down stairs     Home Exercise Program:    [x] (69041) Reviewed/Progressed HEP activities related to strengthening, flexibility, endurance, ROM of core, proximal hip and LE for functional self-care, mobility, lifting and ambulation/stair navigation   [] (69335)Reviewed/Progressed HEP activities related to improving balance, coordination, kinesthetic sense, posture, motor skill, proprioception of core, proximal hip and LE for self care, mobility, lifting, and ambulation/stair navigation      Manual Treatments:  PROM / STM / Oscillations-Mobs:  G-I, II, III, IV (PA's, Inf., Post.)  [] (92654) Provided manual therapy to mobilize LE, proximal hip and/or LS spine soft tissue/joints for the purpose of modulating pain, promoting relaxation,  increasing ROM, reducing/eliminating soft tissue swelling/inflammation/restriction, improving soft tissue extensibility and allowing for proper ROM for normal function with self care, mobility, lifting and ambulation.      Modalities:      Charges:  Timed Code Treatment Minutes: 15'   Total Treatment Minutes: 28'       [x] EVAL (LOW) 455 0626 (typically 20 minutes face-to-face)  [] EVAL (MOD) 40416 (typically 30 minutes face-to-face)  [] EVAL (HIGH) 23310 (typically 45 minutes face-to-face)  [] RE-EVAL     [x] WP(83876) x 1    [] IONTO (34548)  [] NMR (83047) x     [] VASO (79142)  [] Manual (89770) x     [] Other:  [] TA (48140)x     [] Mech Traction (83833)  [] ES(attended) (45863)     [] ES (un) (00207): If BWC Please Indicate Time In/Out and Total Minutes  CPT Code Time in Time out Total Min                                           GOALS:  Patient stated goal: To get stronger prior to surgical intervention. [] Progressing: [] Met: [] Not Met: [] Adjusted    Therapist goals for Patient:   Short Term Goals: To be achieved in: 2 weeks  1. Independent in HEP and progression per patient tolerance, in order to prevent re-injury. [] Progressing: [] Met: [] Not Met: [] Adjusted  2. Patient will have a decrease in pain to facilitate improvement in movement, function, and ADLs as indicated by Functional Deficits. [] Progressing: [] Met: [] Not Met: [] Adjusted    Long Term Goals: To be achieved in: 4-6 weeks  1. Disability index score of 50% or less for the LEFS to assist with reaching prior level of function. [] Progressing: [] Met: [] Not Met: [] Adjusted  2. Patient will demonstrate increased AROM to WNL to allow for proper joint functioning as indicated by patients Functional Deficits. [] Progressing: [] Met: [] Not Met: [] Adjusted  3. Patient will demonstrate an increase in Strength to good proximal hip strength and control, within 5lb HHD in LE to allow for proper functional mobility as indicated by patients Functional Deficits. [] Progressing: [] Met: [] Not Met: [] Adjusted  4. Patient will return to squatting, lifting, standing, walking, kneeling, ascending/descending stairs, and functional activities without increased symptoms or restriction. [] Progressing: [] Met: [] Not Met: [] Adjusted  5.  To gain strength and reduce pain while standing at counter at work. (patient specific functional goal)    [] Progressing: [] Met: [] Not Met: [] Adjusted    Progression Towards Functional goals:  [] Patient is progressing as expected towards functional goals listed. [] Progression is slowed due to complexities listed. [] Progression has been slowed due to co-morbidities. [x] Plan just implemented, too soon to assess goals progression  [] Other:     ASSESSMENT:  See eval    Return to Play: (if applicable)   []  Stage 1: Intro to Strength   []  Stage 2: Return to Run and Strength   []  Stage 3: Return to Jump and Strength   []  Stage 4: Dynamic Strength and Agility   []  Stage 5: Sport Specific Training     []  Ready to Return to Play, Meets All Above Stages   []  Not Ready for Return to Sports   Comments:            Treatment/Activity Tolerance:  [x] Patient tolerated treatment well [] Patient limited by fatique  [] Patient limited by pain  [] Patient limited by other medical complications  [] Other:     Overall Progression Towards Functional goals/ Treatment Progress Update:  [] Patient is progressing as expected towards functional goals listed. [] Progression is slowed due to complexities/Impairments listed. [] Progression has been slowed due to co-morbidities.   [x] Plan just implemented, too soon to assess goals progression <30days   [] Goals require adjustment due to lack of progress  [] Patient is not progressing as expected and requires additional follow up with physician  [] Other    Prognosis for POC: [x] Good [] Fair  [] Poor    Patient requires continued skilled intervention: [x] Yes  [] No        PLAN: See eval  [] Continue per plan of care [] Alter current plan (see comments)  [x] Plan of care initiated [] Hold pending MD visit [] Discharge    Electronically signed by: Serge Pimentel PT    Note: If patient does not return for scheduled/recommended follow up visits, this note will serve as a discharge from care along with the most recent update on progress.

## 2021-12-01 NOTE — PLAN OF CARE
[facility]  [address] [address]  Phone [phone]   Fax [phone]                                                       Physical Therapy Certification    Dear Referring Practitioner: Vamshi Dutta MD,    We had the pleasure of evaluating the following patient for physical therapy services at [address] [place] [address]. A summary of our findings can be found in the initial assessment below. This includes our plan of care. If you have any questions or concerns regarding these findings, please do not hesitate to contact me at the office phone number checked above. Thank you for the referral.       Physician Signature:_______________________________Date:__________________  By signing above (or electronic signature), therapists plan is approved by physician      Patient: Lidya Oliva   : 1980   MRN: [id number]  Referring Physician:        Evaluation Date: 2021      Medical Diagnosis Information:  Diagnosis: M25.562 (ICD-10-CM) - Left knee pain, unspecified chronicity   Treatment Diagnosis: L knee pain and dysfunction                                         Insurance information: PT Insurance Information: [place]     Precautions/ Contra-indications: 10/22/21 LEFT KNEE ARTHROSCOPY; CHONDROPLASTY;  CARTILAGE BIOPSY     C-SSRS Triggered by Intake questionnaire (Past 2 wk assessment):   [x] No, Questionnaire did not trigger screening.   [] Yes, Patient intake triggered further evaluation      [] C-SSRS Screening completed  [] PCP notified via Plan of Care  [] Emergency services notified     Latex Allergy:  [x]NO      []YES  Preferred Language for Healthcare:   [x]English       []other:    SUBJECTIVE: Patient stated complaint: See body chart     Relevant Medical History: chronic L knee pain.    Functional Disability Index: LEFS: 30/80    Pain Scale: 1-10/10  Easing factors: See body chart  Provocative factors: See body chart     Type: [x]Constant   []Intermittent  []Radiating [x]Localized []other:     Numbness/Tingling: Patient reports no numbness or tingling at this time. Occupation/School: Manager at [employer] Level of Function: Independent with ADLs and IADLs, standing for hours at a time. OBJECTIVE:     ROM LEFT RIGHT   HIP Flex WNL WNL   HIP Abd     HIP Ext     HIP IR     HIP ER     Knee ext 0 0   Knee Flex 126 130   Ankle PF     Ankle DF     Ankle In     Ankle Ev     Strength (lb) LEFT RIGHT   HIP Flexors     HIP Abductors     HIP Ext     Hip ER     Knee EXT (quad) 16.8 41.3   Knee Flex (HS) 20.6  36.9   Ankle DF     Ankle PF     Ankle Inv     Ankle EV          Circumference  Mid apex  7 cm prox               Balance: Not formally tested. Reflexes/Sensation:    [x]Dermatomes/Myotomes intact    [x]Reflexes equal and normal bilaterally   []Other:    Joint mobility: Patient presents with functional joint mobility with some slight stiffness of the L patellar mobility. [x]Normal    []Hypo   []Hyper    Palpation: Patient reports tenderness of the medial and lateral L knee joint lines. Functional Mobility/Transfers: Patient reports pain with squatting, lifting, standing, walking, kneeling, and stairs. Posture: unremarkable. Bandages/Dressings/Incisions: Incision is healing well with no sings of infection. Gait: (include devices/WB status) Antalgic gait on L due to pain with reduced TKE. Orthopedic Special Tests: NA                       [x] Patient history, allergies, meds reviewed. Medical chart reviewed. See intake form. Review Of Systems (ROS):  [x]Performed Review of systems (Integumentary, CardioPulmonary, Neurological) by intake and observation. Intake form has been scanned into medical record.  Patient has been instructed to contact their primary care physician regarding ROS issues if not already being addressed at this time. Co-morbidities/Complexities (which will affect course of rehabilitation):   []None           Arthritic conditions   []Rheumatoid arthritis (M05.9)  []Osteoarthritis (M19.91)   Cardiovascular conditions   []Hypertension (I10)  []Hyperlipidemia (E78.5)  []Angina pectoris (I20)  []Atherosclerosis (I70)   Musculoskeletal conditions   []Disc pathology   []Congenital spine pathologies   []Prior surgical intervention  []Osteoporosis (M81.8)  []Osteopenia (M85.8)   Endocrine conditions   []Hypothyroid (E03.9)  []Hyperthyroid Gastrointestinal conditions   []Constipation (Q53.52)   Metabolic conditions   []Morbid obesity (E66.01)  []Diabetes type 1(E10.65) or 2 (E11.65)   []Neuropathy (G60.9)     Pulmonary conditions   []Asthma (J45)  []Coughing   []COPD (J44.9)   Psychological Disorders  []Anxiety (F41.9)  [x]Depression (F32.9)   []Other:   []Other:          Barriers to/and or personal factors that will affect rehab potential:              []Age  []Sex              []Motivation/Lack of Motivation                        []Co-Morbidities              []Cognitive Function, education/learning barriers              []Environmental, home barriers              []profession/work barriers  []past PT/medical experience  []other:  Justification:     Falls Risk Assessment (30 days):   [x] Falls Risk assessed and no intervention required.   [] Falls Risk assessed and Patient requires intervention due to being higher risk   TUG score (>12s at risk):     [] Falls education provided, including         ASSESSMENT:   Functional Impairments:     []Noted lumbar/proximal hip/LE joint hypomobility   [x]Decreased LE functional ROM   [x]Decreased core/proximal hip strength and neuromuscular control   [x]Decreased LE functional strength   [x]Reduced balance/proprioceptive control   []other:      Functional Activity Limitations (from functional questionnaire and intake)   [x]Reduced ability to tolerate prolonged functional positions   [x]Reduced ability or difficulty with changes of positions or transfers between positions   [x]Reduced ability to maintain good posture and demonstrate good body mechanics with sitting, bending, and lifting   [x]Reduced ability to sleep   [] Reduced ability or tolerance with driving and/or computer work   [x]Reduced ability to perform lifting, carrying tasks   [x]Reduced ability to squat   [x]Reduced ability to forward bend   [x]Reduced ability to ambulate prolonged functional periods/distances/surfaces   [x]Reduced ability to ascend/descend stairs   [x]Reduced ability to run, hop, cut or jump   []other:    Participation Restrictions   [x]Reduced participation in self care activities   [x]Reduced participation in home management activities   [x]Reduced participation in work activities   [x]Reduced participation in social activities. [x]Reduced participation in sport/recreation activities. Classification :    [x]Signs/symptoms consistent with post-surgical status including decreased ROM, strength and function.    []Signs/symptoms consistent with joint sprain/strain   []Signs/symptoms consistent with patella-femoral syndrome   []Signs/symptoms consistent with knee OA/hip OA   []Signs/symptoms consistent with internal derangement of knee/Hip   []Signs/symptoms consistent with functional hip weakness/NMR control      []Signs/symptoms consistent with tendinitis/tendinosis    []signs/symptoms consistent with pathology which may benefit from Dry needling      []other:      Prognosis/Rehab Potential:      []Excellent   [x]Good    []Fair   []Poor    Tolerance of evaluation/treatment:    []Excellent   [x]Good    []Fair   []Poor    Physical Therapy Evaluation Complexity Justification  [x] A history of present problem with:  [] no personal factors and/or comorbidities that impact the plan of care;  [x]1-2 personal factors and/or comorbidities that impact the plan of care  []3 will have a decrease in pain to facilitate improvement in movement, function, and ADLs as indicated by Functional Deficits. [] Progressing: [] Met: [] Not Met: [] Adjusted    Long Term Goals: To be achieved in: 4-6 weeks  1. Disability index score of 50% or less for the LEFS to assist with reaching prior level of function. [] Progressing: [] Met: [] Not Met: [] Adjusted  2. Patient will demonstrate increased AROM to WNL to allow for proper joint functioning as indicated by patients Functional Deficits. [] Progressing: [] Met: [] Not Met: [] Adjusted  3. Patient will demonstrate an increase in Strength to good proximal hip strength and control, within 5lb HHD in LE to allow for proper functional mobility as indicated by patients Functional Deficits. [] Progressing: [] Met: [] Not Met: [] Adjusted  4. Patient will return to squatting, lifting, standing, walking, kneeling, ascending/descending stairs, andfunctional activities without increased symptoms or restriction. [] Progressing: [] Met: [] Not Met: [] Adjusted  5.  To gain strength and reduce pain while standing at counter at work. (patient specific functional goal)    [] Progressing: [] Met: [] Not Met: [] Adjusted     Electronically signed by:  Joseline Carrington PT

## 2021-12-08 ENCOUNTER — HOSPITAL ENCOUNTER (OUTPATIENT)
Dept: PHYSICAL THERAPY | Age: 41
Setting detail: THERAPIES SERIES
Discharge: HOME | End: 2021-12-08
Payer: MEDICARE

## 2021-12-08 NOTE — PROGRESS NOTES
[address], [facility]    Physical Therapy  Cancellation/No-show Note  Patient Name:  Melissa Blevins  :  1980   Date:  2021  Cancelled visits to date: 1  No-shows to date: 1    For today's appointment patient:  []  Cancelled  []  Rescheduled appointment  [x]  No-show     Reason given by patient:  []  Patient ill  []  Conflicting appointment  []  No transportation    []  Conflict with work  [x]  No reason given  [x]  Other:     Comments:      Phone call information:   [x]  Phone call made today to patient at _ time at number provided:      []  Patient answered, conversation as follows:    []  Patient did not answer, message left as follows:  [x]  Phone call not made today  []  Phone call not needed - pt contacted us to cancel and provided reason for cancellation.      Electronically signed by:  Therese Fischer, [id number]

## 2021-12-15 ENCOUNTER — HOSPITAL ENCOUNTER (OUTPATIENT)
Dept: PHYSICAL THERAPY | Age: 41
Setting detail: THERAPIES SERIES
Discharge: HOME | End: 2021-12-15
Payer: MEDICARE

## 2021-12-15 NOTE — PROGRESS NOTES
[facility]    Physical Therapy  Cancellation/No-show Note  Patient Name:  Herber Hoyt  :  1980   Date:  12/15/2021  Cancelled visits to date: 2  No-shows to date: 1    For today's appointment patient:  [x]  Cancelled  []  Rescheduled appointment  []  No-show     Reason given by patient:  [x]  Patient ill  []  Conflicting appointment  []  No transportation    []  Conflict with work  []  No reason given  []  Other:     Comments:   Patient reports she has been exposed to COVID-19 and will not be able to come to PT. Phone call information:   []  Phone call made today to patient at _ time at number provided:      []  Patient answered, conversation as follows:    []  Patient did not answer, message left as follows:  []  Phone call not made today  [x]  Phone call not needed - pt contacted us to cancel and provided reason for cancellation.      Electronically signed by:  Irma Iqbal, [facility], [id number]

## 2021-12-22 ENCOUNTER — HOSPITAL ENCOUNTER (OUTPATIENT)
Dept: PHYSICAL THERAPY | Age: 41
Setting detail: THERAPIES SERIES
End: 2021-12-22
Payer: MEDICARE

## 2021-12-29 ENCOUNTER — APPOINTMENT (OUTPATIENT)
Dept: PHYSICAL THERAPY | Age: 41
End: 2021-12-29
Payer: MEDICARE

## 2022-01-06 ENCOUNTER — OFFICE VISIT (OUTPATIENT)
Dept: ORTHOPEDIC SURGERY | Age: 42
End: 2022-01-06

## 2022-01-06 VITALS — BODY MASS INDEX: 37.44 KG/M2 | WEIGHT: 247 LBS | HEIGHT: 68 IN

## 2022-01-06 DIAGNOSIS — M17.12 PATELLOFEMORAL ARTHRITIS OF LEFT KNEE: ICD-10-CM

## 2022-01-06 DIAGNOSIS — M25.571 RIGHT ANKLE PAIN, UNSPECIFIED CHRONICITY: Primary | ICD-10-CM

## 2022-01-06 DIAGNOSIS — Z01.818 PRE-OP TESTING: ICD-10-CM

## 2022-01-06 PROCEDURE — 99024 POSTOP FOLLOW-UP VISIT: CPT | Performed by: ORTHOPAEDIC SURGERY

## 2022-01-10 NOTE — PROGRESS NOTES
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 Unfortunately she continues to have pain on a daily basis. This does interfere with her overall quality of life. She has pain with standing, walking, stairs. Objective:  Ht 5' 8\" (1.727 m)   Wt 247 lb (112 kg)   BMI 37.56 kg/m²      Physical Exam:  The patient is well-appearing and in no apparent distress  Examination of the left knee   There is a small effusion, no gross deformity or skin changes  Range of motion reveals 0 to 125  Neg lachman, negative posterior drawer, no pain or laxity with varus or valgus stress at 0 degrees and 30 degrees of flexion  no joint line tenderness  5 out of 5 strength throughout distal muscle groups  Sensation is intact to light touch throughout all distributions  There is no calf swelling or tenderness  Palpable DP pulse, brisk cap refill, 2+ symmetric reflexes    Assessment:  Status post left knee arthroscopy with chondroplasty and cartilage biopsy on 10/22/2021  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 She has a very large full-thickness chondral defect of the patella that involves the majority of the patella. 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 She has been treated to date with activity modification, anti-inflammatory medications, physical therapy, injections, and arthroscopic chondroplasty. Options at this point include cartilage restoration surgery versus partial patellofemoral arthroplasty.   I did tell the patient that unfortunately cartilage restoration in the form of autologous chondrocyte implantation as well as osteochondral allograft transplantation would not be covered under her insurance. She does understand this. She would also be concerned with the prolonged recovery required with close restoring procedures. Alternative option would be patellofemoral arthroplasty utilizing an inlay resurfacing technique. The risk and benefits of this were thoroughly discussed. She does understand elects proceed. Adela Rivas MD            Orthopaedic Surgery Sports Medicine and [facility] [address] and [facility]            Team Physician [facility] ([place]      Disclaimer: This note was dictated with voice recognition software. Though review and correction are routine, we apologize for any errors.

## 2022-01-14 NOTE — PROGRESS NOTES
Name_______________________________________Printed:____________________  Date and time of surgery_2/4 0730_______________________Arrival Time:__0600______________   1. The instructions given regarding when and if a patient needs to stop oral intake prior to surgery varies. Follow the specific instructions you were given                  x___Nothing to eat or to drink after Midnight the night before.                   ____Carbo loading or ERAS instructions will be given to select patients-if you have been given those instructions -please do the following                           The evening before your surgery after dinner before midnight drink 40 ounces of gatorade. If you are diabetic use sugar free. The morning of surgery drink 40 ounces of water. This needs to be finished 3 hours prior to your surgery start time. 2. Take the following pills with a small sip of water on the morning of surgery___prozac adderall________________________________________________                  Do not take blood pressure medications ending in pril or sartan the tulio prior to surgery or the morning of surgery_   3. Aspirin, Ibuprofen, Advil, Naproxen, Vitamin E and other Anti-inflammatory products and supplements should be stopped for 5 -7days before surgery or as directed by your physician. 4. Check with your Doctor regarding stopping Plavix, Coumadin,Eliquis, Lovenox,Effient,Pradaxa,Xarelto, Fragmin or other blood thinners and follow their instructions. 5. Do not smoke, and do not drink any alcoholic beverages 24 hours prior to surgery. This includes NA Beer. Refrain from the usage of any recreational drugs. 6. You may brush your teeth and gargle the morning of surgery. DO NOT SWALLOW WATER   7. You MUST make arrangements for a responsible adult to stay on site while you are here and take you home after your surgery. You will not be allowed to leave alone or drive yourself home.   It is strongly suggested someone stay with you the first 24 hrs. Your surgery will be cancelled if you do not have a ride home. 8. A parent/legal guardian must accompany a child scheduled for surgery and plan to stay at the hospital until the child is discharged. Please do not bring other children with you. 9. Please wear simple, loose fitting clothing to the hospital.  [place] not bring valuables (money, credit cards, checkbooks, etc.) Do not wear any makeup (including no eye makeup) or nail polish on your fingers or toes. 10. DO NOT wear any jewelry or piercings on day of surgery. All body piercing jewelry must be removed. 11. If you have ___dentures, they will be removed before going to the OR; we will provide you a container. If you wear ___contact lenses or ___glasses, they will be removed; please bring a case for them. 12. Please see your family doctor/pediatrician for a history & physical and/or concerning medications. Bring any test results/reports from your physician's office. PCP__________________Phone___________H&P Appt. Date________             13 If you  have a Living Will and Durable Power of  for Healthcare, please bring in a copy. 15. Notify your Surgeon if you develop any illness between now and surgery  time, cough, cold, fever, sore throat, nausea, vomiting, etc.  Please notify your surgeon if you experience dizziness, shortness of breath or blurred vision between now & the time of your surgery             15. DO NOT shave your operative site 96 hours prior to surgery. For face & neck surgery, men may use an electric razor 48 hours prior to surgery. 16. Shower the night before or morning of surgery using an antibacterial soap or as you have been instructed. 17. To provide excellent care visitors will be limited to one in the room at any given time. 18.  Please bring picture ID and insurance card.              19.  Visit our web site for additional information:  [email or web address]              20.During flu season no children under the age of 15 are permitted in the hospital for the safety of all patients. 21. If you take a long acting insulin in the evening only  take half of your usual  dose the night  before your procedure              22. If you use a c-pap please bring DOS if staying overnight,             23.For your convenience [facility] has a pharmacy on site to fill your prescriptions. 24. If you use oxygen and have a portable tank please bring it  with you the DOS             25. Bring a complete list of all your medications with name and dose include any supplements. 26. Other_pats 1/31  pcp 1/19_________________________________________   *Please call pre admission testing if you any further questions   Dion Dorsey   [address]    [place] [address] [place]  [phone]   [facility] [address] REYNA PRESCOTT   [phone]           COVID TESTING    _x__ Covid test to be done 3-5 days prior to scheduled surgery -patient aware they are REQUIRED to bring a copy of the negative result DOS-if they receive a positive result to notify their surgeon         If known - indicate where patient is getting covid test done ___mff 1/31________________________________________________________    ___ Rapid - DOS    ___ Other___________________________________      Rommel Morgan POLICY(subject to change)    There is a one visitor policy at [facility] for all surgeries and endoscopies. Whether the visitor can stay or will be asked to wait in the car will depend on the current policy and if social distancing can be maintained. The policy is subject to change at any time. Please make sure the visitor has a cell phone that is on,charged and able to accept calls, as this may be the way that the staff communicates with them. Pain management is NO VISITOR policyThe patients ride is expected to remain in the car with a cell phone for communication. If the ride is leaving the hospital grounds please make sure they are back in time for pickup. Have the patient inform the staff on arrival what their rides plans are while the patient is in the facility. At the MAIN there is one visitor allowed. Please note that the visitor policy is subject to change. All above information reviewed with patient in person or by phone. Patient verbalizes understanding. All questions and concerns addressed.                                                                                                  Patient/Rep____per phone/pt________________                                                                                                                                    PRE OP INSTRUCTIONS

## 2022-01-19 ENCOUNTER — TELEPHONE (OUTPATIENT)
Dept: ORTHOPEDIC SURGERY | Age: 42
End: 2022-01-19

## 2022-01-19 ENCOUNTER — OFFICE VISIT (OUTPATIENT)
Dept: FAMILY MEDICINE CLINIC | Age: 42
End: 2022-01-19
Payer: MEDICARE

## 2022-01-19 VITALS
HEART RATE: 79 BPM | OXYGEN SATURATION: 96 % | WEIGHT: 255 LBS | HEIGHT: 68 IN | BODY MASS INDEX: 38.65 KG/M2 | TEMPERATURE: 97.8 F | DIASTOLIC BLOOD PRESSURE: 80 MMHG | SYSTOLIC BLOOD PRESSURE: 120 MMHG

## 2022-01-19 DIAGNOSIS — Z01.818 PRE-OP EXAM: ICD-10-CM

## 2022-01-19 DIAGNOSIS — Z23 NEED FOR VACCINATION: Primary | ICD-10-CM

## 2022-01-19 DIAGNOSIS — Z00.00 ANNUAL PHYSICAL EXAM: ICD-10-CM

## 2022-01-19 PROCEDURE — G8417 CALC BMI ABV UP PARAM F/U: HCPCS | Performed by: FAMILY MEDICINE

## 2022-01-19 PROCEDURE — 90471 IMMUNIZATION ADMIN: CPT | Performed by: FAMILY MEDICINE

## 2022-01-19 PROCEDURE — G8427 DOCREV CUR MEDS BY ELIG CLIN: HCPCS | Performed by: FAMILY MEDICINE

## 2022-01-19 PROCEDURE — 99243 OFF/OP CNSLTJ NEW/EST LOW 30: CPT | Performed by: FAMILY MEDICINE

## 2022-01-19 PROCEDURE — G8482 FLU IMMUNIZE ORDER/ADMIN: HCPCS | Performed by: FAMILY MEDICINE

## 2022-01-19 PROCEDURE — 90674 CCIIV4 VAC NO PRSV 0.5 ML IM: CPT | Performed by: FAMILY MEDICINE

## 2022-01-19 NOTE — TELEPHONE ENCOUNTER
General Question     Subject: I ATTEMPTED TO VERIFY THE PARAMOUNT INSURANCE, BUT IT KEPT COMING BACK E-REJECTED.     Patient and /or Facility Request:   Contact Number:

## 2022-01-19 NOTE — TELEPHONE ENCOUNTER
Pt is currently at PCP office and they are working on [employer] in chart. PT has had no changes to insurance.

## 2022-01-19 NOTE — TELEPHONE ENCOUNTER
General Question     Subject: PATIENT CANNOT FIND HER PRE-OP APPOINTMENT. CAN A ORM BE SENT VIA FAX TO THE DOCTOR? SHE HAS A 4:00 APPT. THIS AFTERNOON. PLEASE ADVISE PATIENT.     Patient:  Migue Bel  Contact Number: [phone]

## 2022-01-19 NOTE — TELEPHONE ENCOUNTER
PLEASE CONTACT PATIENT REGARDING TERMED PARAMOUNT INSURANCE IN Epic.   PLEASE OPTAIN NEW INSURANCE SO SURGERY PRECERT CAN BEGIN

## 2022-01-20 ENCOUNTER — TELEPHONE (OUTPATIENT)
Dept: ORTHOPEDIC SURGERY | Age: 42
End: 2022-01-20

## 2022-01-31 ENCOUNTER — HOSPITAL ENCOUNTER (OUTPATIENT)
Age: 42
Discharge: HOME | End: 2022-01-31
Payer: MEDICARE

## 2022-01-31 DIAGNOSIS — Z01.818 PREOP TESTING: ICD-10-CM

## 2022-01-31 LAB
A/G RATIO: 1.4 (ref 1.1–2.2)
ABO/RH: NORMAL
ALBUMIN SERPL-MCNC: 4 G/DL (ref 3.4–5)
ALP BLD-CCNC: 62 U/L (ref 40–129)
ALT SERPL-CCNC: 19 U/L (ref 10–40)
ANION GAP SERPL CALCULATED.3IONS-SCNC: 11 MMOL/L (ref 3–16)
ANTIBODY SCREEN: NORMAL
APTT: 37.3 SEC (ref 26.2–38.6)
AST SERPL-CCNC: 18 U/L (ref 15–37)
BACTERIA: ABNORMAL /HPF
BASOPHILS ABSOLUTE: 0.1 K/UL (ref 0–0.2)
BASOPHILS RELATIVE PERCENT: 0.8 %
BILIRUB SERPL-MCNC: <0.2 MG/DL (ref 0–1)
BILIRUBIN URINE: NEGATIVE
BLOOD, URINE: NEGATIVE
BUN BLDV-MCNC: 9 MG/DL (ref 7–20)
CALCIUM SERPL-MCNC: 8.8 MG/DL (ref 8.3–10.6)
CHLORIDE BLD-SCNC: 104 MMOL/L (ref 99–110)
CLARITY: ABNORMAL
CO2: 23 MMOL/L (ref 21–32)
COLOR: YELLOW
CREAT SERPL-MCNC: 0.6 MG/DL (ref 0.6–1.1)
EOSINOPHILS ABSOLUTE: 0.1 K/UL (ref 0–0.6)
EOSINOPHILS RELATIVE PERCENT: 1.5 %
EPITHELIAL CELLS, UA: 4 /HPF (ref 0–5)
GFR AFRICAN AMERICAN: >60
GFR NON-AFRICAN AMERICAN: >60
GLUCOSE BLD-MCNC: 114 MG/DL (ref 70–99)
GLUCOSE URINE: NEGATIVE MG/DL
HCT VFR BLD CALC: 39.8 % (ref 36–48)
HEMOGLOBIN: 12.6 G/DL (ref 12–16)
HYALINE CASTS: 0 /LPF (ref 0–8)
INR BLD: 0.96 (ref 0.88–1.12)
KETONES, URINE: NEGATIVE MG/DL
LEUKOCYTE ESTERASE, URINE: ABNORMAL
LYMPHOCYTES ABSOLUTE: 2.3 K/UL (ref 1–5.1)
LYMPHOCYTES RELATIVE PERCENT: 32.4 %
MCH RBC QN AUTO: 24.9 PG (ref 26–34)
MCHC RBC AUTO-ENTMCNC: 31.6 G/DL (ref 31–36)
MCV RBC AUTO: 78.8 FL (ref 80–100)
MICROSCOPIC EXAMINATION: YES
MONOCYTES ABSOLUTE: 0.4 K/UL (ref 0–1.3)
MONOCYTES RELATIVE PERCENT: 5.7 %
NEUTROPHILS ABSOLUTE: 4.2 K/UL (ref 1.7–7.7)
NEUTROPHILS RELATIVE PERCENT: 59.6 %
NITRITE, URINE: NEGATIVE
PDW BLD-RTO: 16.4 % (ref 12.4–15.4)
PH UA: 6.5 (ref 5–8)
PLATELET # BLD: 252 K/UL (ref 135–450)
PMV BLD AUTO: 8.2 FL (ref 5–10.5)
POTASSIUM SERPL-SCNC: 4.1 MMOL/L (ref 3.5–5.1)
PROTEIN UA: NEGATIVE MG/DL
PROTHROMBIN TIME: 10.8 SEC (ref 9.9–12.7)
RBC # BLD: 5.05 M/UL (ref 4–5.2)
RBC UA: 3 /HPF (ref 0–4)
SODIUM BLD-SCNC: 138 MMOL/L (ref 136–145)
SPECIFIC GRAVITY UA: 1.01 (ref 1–1.03)
TOTAL PROTEIN: 6.8 G/DL (ref 6.4–8.2)
URINE TYPE: ABNORMAL
UROBILINOGEN, URINE: 0.2 E.U./DL
WBC # BLD: 7 K/UL (ref 4–11)
WBC UA: 5 /HPF (ref 0–5)

## 2022-01-31 PROCEDURE — 86900 BLOOD TYPING SEROLOGIC ABO: CPT

## 2022-01-31 PROCEDURE — 87086 URINE CULTURE/COLONY COUNT: CPT

## 2022-01-31 PROCEDURE — 85610 PROTHROMBIN TIME: CPT

## 2022-01-31 PROCEDURE — 86850 RBC ANTIBODY SCREEN: CPT

## 2022-01-31 PROCEDURE — 87081 CULTURE SCREEN ONLY: CPT

## 2022-01-31 PROCEDURE — 85025 COMPLETE CBC W/AUTO DIFF WBC: CPT

## 2022-01-31 PROCEDURE — U0003 INFECTIOUS AGENT DETECTION BY NUCLEIC ACID (DNA OR RNA); SEVERE ACUTE RESPIRATORY SYNDROME CORONAVIRUS 2 (SARS-COV-2) (CORONAVIRUS DISEASE [COVID-19]), AMPLIFIED PROBE TECHNIQUE, MAKING USE OF HIGH THROUGHPUT TECHNOLOGIES AS DESCRIBED BY CMS-2020-01-R: HCPCS

## 2022-01-31 PROCEDURE — 85730 THROMBOPLASTIN TIME PARTIAL: CPT

## 2022-01-31 PROCEDURE — 80053 COMPREHEN METABOLIC PANEL: CPT

## 2022-01-31 PROCEDURE — U0005 INFEC AGEN DETEC AMPLI PROBE: HCPCS

## 2022-01-31 PROCEDURE — 81001 URINALYSIS AUTO W/SCOPE: CPT

## 2022-01-31 PROCEDURE — 86901 BLOOD TYPING SEROLOGIC RH(D): CPT

## 2022-01-31 PROCEDURE — 36415 COLL VENOUS BLD VENIPUNCTURE: CPT

## 2022-02-01 LAB
SARS-COV-2: NOT DETECTED
URINE CULTURE, ROUTINE: NORMAL

## 2022-02-02 ENCOUNTER — TELEPHONE (OUTPATIENT)
Dept: ORTHOPEDIC SURGERY | Age: 42
End: 2022-02-02

## 2022-02-02 DIAGNOSIS — G89.18 POST-OP PAIN: Primary | ICD-10-CM

## 2022-02-02 DIAGNOSIS — M17.12 PATELLOFEMORAL ARTHRITIS OF LEFT KNEE: ICD-10-CM

## 2022-02-02 LAB — MRSA CULTURE ONLY: NORMAL

## 2022-02-02 NOTE — PROGRESS NOTES
Patient having surgery 2/4 with Dr. Eliezer Martinez    Emailed joint class video. Patient requests a rw after surgery and will schedule first pt appt at [facility] for Monday Feb 7th.

## 2022-02-03 ENCOUNTER — ANESTHESIA EVENT (OUTPATIENT)
Dept: OPERATING ROOM | Age: 42
End: 2022-02-03
Payer: MEDICARE

## 2022-02-04 ENCOUNTER — ANESTHESIA (OUTPATIENT)
Dept: OPERATING ROOM | Age: 42
End: 2022-02-04
Payer: MEDICARE

## 2022-02-04 ENCOUNTER — APPOINTMENT (OUTPATIENT)
Dept: GENERAL RADIOLOGY | Age: 42
End: 2022-02-04
Attending: ORTHOPAEDIC SURGERY
Payer: MEDICARE

## 2022-02-04 ENCOUNTER — HOSPITAL ENCOUNTER (OUTPATIENT)
Age: 42
Setting detail: OUTPATIENT SURGERY
Discharge: HOME | End: 2022-02-04
Attending: ORTHOPAEDIC SURGERY | Admitting: ORTHOPAEDIC SURGERY
Payer: MEDICARE

## 2022-02-04 VITALS
HEART RATE: 75 BPM | OXYGEN SATURATION: 94 % | DIASTOLIC BLOOD PRESSURE: 56 MMHG | HEIGHT: 68 IN | SYSTOLIC BLOOD PRESSURE: 99 MMHG | WEIGHT: 256 LBS | RESPIRATION RATE: 15 BRPM | TEMPERATURE: 97 F | BODY MASS INDEX: 38.8 KG/M2

## 2022-02-04 VITALS
SYSTOLIC BLOOD PRESSURE: 135 MMHG | RESPIRATION RATE: 23 BRPM | OXYGEN SATURATION: 94 % | TEMPERATURE: 97.2 F | DIASTOLIC BLOOD PRESSURE: 71 MMHG

## 2022-02-04 DIAGNOSIS — Z01.818 PREOP TESTING: Primary | ICD-10-CM

## 2022-02-04 LAB
ABO/RH: NORMAL
ANTIBODY SCREEN: NORMAL
HCG(URINE) PREGNANCY TEST: NEGATIVE

## 2022-02-04 PROCEDURE — 6360000002 HC RX W HCPCS: Performed by: ORTHOPAEDIC SURGERY

## 2022-02-04 PROCEDURE — 2500000003 HC RX 250 WO HCPCS: Performed by: NURSE ANESTHETIST, CERTIFIED REGISTERED

## 2022-02-04 PROCEDURE — 6360000002 HC RX W HCPCS: Performed by: NURSE ANESTHETIST, CERTIFIED REGISTERED

## 2022-02-04 PROCEDURE — 3700000000 HC ANESTHESIA ATTENDED CARE: Performed by: ORTHOPAEDIC SURGERY

## 2022-02-04 PROCEDURE — C1776 JOINT DEVICE (IMPLANTABLE): HCPCS | Performed by: ORTHOPAEDIC SURGERY

## 2022-02-04 PROCEDURE — 7100000011 HC PHASE II RECOVERY - ADDTL 15 MIN: Performed by: ORTHOPAEDIC SURGERY

## 2022-02-04 PROCEDURE — 3600000015 HC SURGERY LEVEL 5 ADDTL 15MIN: Performed by: ORTHOPAEDIC SURGERY

## 2022-02-04 PROCEDURE — 2500000003 HC RX 250 WO HCPCS: Performed by: ORTHOPAEDIC SURGERY

## 2022-02-04 PROCEDURE — 3600000005 HC SURGERY LEVEL 5 BASE: Performed by: ORTHOPAEDIC SURGERY

## 2022-02-04 PROCEDURE — 86901 BLOOD TYPING SEROLOGIC RH(D): CPT

## 2022-02-04 PROCEDURE — 2709999900 HC NON-CHARGEABLE SUPPLY: Performed by: ORTHOPAEDIC SURGERY

## 2022-02-04 PROCEDURE — 7100000000 HC PACU RECOVERY - FIRST 15 MIN: Performed by: ORTHOPAEDIC SURGERY

## 2022-02-04 PROCEDURE — 2580000003 HC RX 258: Performed by: ORTHOPAEDIC SURGERY

## 2022-02-04 PROCEDURE — 97116 GAIT TRAINING THERAPY: CPT

## 2022-02-04 PROCEDURE — C1713 ANCHOR/SCREW BN/BN,TIS/BN: HCPCS | Performed by: ORTHOPAEDIC SURGERY

## 2022-02-04 PROCEDURE — 7100000010 HC PHASE II RECOVERY - FIRST 15 MIN: Performed by: ORTHOPAEDIC SURGERY

## 2022-02-04 PROCEDURE — 86900 BLOOD TYPING SEROLOGIC ABO: CPT

## 2022-02-04 PROCEDURE — 6370000000 HC RX 637 (ALT 250 FOR IP): Performed by: ANESTHESIOLOGY

## 2022-02-04 PROCEDURE — 2720000010 HC SURG SUPPLY STERILE: Performed by: ORTHOPAEDIC SURGERY

## 2022-02-04 PROCEDURE — 97162 PT EVAL MOD COMPLEX 30 MIN: CPT

## 2022-02-04 PROCEDURE — 86850 RBC ANTIBODY SCREEN: CPT

## 2022-02-04 PROCEDURE — 73560 X-RAY EXAM OF KNEE 1 OR 2: CPT

## 2022-02-04 PROCEDURE — 84703 CHORIONIC GONADOTROPIN ASSAY: CPT

## 2022-02-04 PROCEDURE — 7100000001 HC PACU RECOVERY - ADDTL 15 MIN: Performed by: ORTHOPAEDIC SURGERY

## 2022-02-04 PROCEDURE — 3700000001 HC ADD 15 MINUTES (ANESTHESIA): Performed by: ORTHOPAEDIC SURGERY

## 2022-02-04 DEVICE — IMPLANTABLE DEVICE: Type: IMPLANTABLE DEVICE | Site: KNEE | Status: FUNCTIONAL

## 2022-02-04 DEVICE — CEMENT BNE 40GM FULL DOSE PMMA W/ GENT HI VISC RADPQ LNG: Type: IMPLANTABLE DEVICE | Site: PATELLA | Status: FUNCTIONAL

## 2022-02-04 DEVICE — IMPLANTABLE DEVICE: Type: IMPLANTABLE DEVICE | Site: PATELLA | Status: FUNCTIONAL

## 2022-02-04 RX ORDER — LIDOCAINE HYDROCHLORIDE 20 MG/ML
INJECTION, SOLUTION INFILTRATION; PERINEURAL PRN
Status: DISCONTINUED | OUTPATIENT
Start: 2022-02-04 | End: 2022-02-04 | Stop reason: SDUPTHER

## 2022-02-04 RX ORDER — PROCHLORPERAZINE EDISYLATE 5 MG/ML
5 INJECTION INTRAMUSCULAR; INTRAVENOUS
Status: DISCONTINUED | OUTPATIENT
Start: 2022-02-04 | End: 2022-02-04 | Stop reason: HOSPADM

## 2022-02-04 RX ORDER — HYDRALAZINE HYDROCHLORIDE 20 MG/ML
5 INJECTION INTRAMUSCULAR; INTRAVENOUS EVERY 10 MIN PRN
Status: DISCONTINUED | OUTPATIENT
Start: 2022-02-04 | End: 2022-02-04 | Stop reason: HOSPADM

## 2022-02-04 RX ORDER — SODIUM CHLORIDE 9 MG/ML
25 INJECTION, SOLUTION INTRAVENOUS PRN
Status: DISCONTINUED | OUTPATIENT
Start: 2022-02-04 | End: 2022-02-04 | Stop reason: HOSPADM

## 2022-02-04 RX ORDER — ONDANSETRON 4 MG/1
4 TABLET, FILM COATED ORAL EVERY 8 HOURS PRN
Qty: 21 TABLET | Refills: 0 | Status: SHIPPED | OUTPATIENT
Start: 2022-02-04 | End: 2022-02-11

## 2022-02-04 RX ORDER — SUCCINYLCHOLINE CHLORIDE 20 MG/ML
INJECTION INTRAMUSCULAR; INTRAVENOUS PRN
Status: DISCONTINUED | OUTPATIENT
Start: 2022-02-04 | End: 2022-02-04 | Stop reason: SDUPTHER

## 2022-02-04 RX ORDER — SODIUM CHLORIDE, SODIUM LACTATE, POTASSIUM CHLORIDE, CALCIUM CHLORIDE 600; 310; 30; 20 MG/100ML; MG/100ML; MG/100ML; MG/100ML
INJECTION, SOLUTION INTRAVENOUS CONTINUOUS
Status: DISCONTINUED | OUTPATIENT
Start: 2022-02-04 | End: 2022-02-04 | Stop reason: HOSPADM

## 2022-02-04 RX ORDER — FENTANYL CITRATE 50 UG/ML
25 INJECTION, SOLUTION INTRAMUSCULAR; INTRAVENOUS EVERY 5 MIN PRN
Status: DISCONTINUED | OUTPATIENT
Start: 2022-02-04 | End: 2022-02-04 | Stop reason: HOSPADM

## 2022-02-04 RX ORDER — LIDOCAINE HYDROCHLORIDE 10 MG/ML
1 INJECTION, SOLUTION EPIDURAL; INFILTRATION; INTRACAUDAL; PERINEURAL
Status: DISCONTINUED | OUTPATIENT
Start: 2022-02-04 | End: 2022-02-04 | Stop reason: HOSPADM

## 2022-02-04 RX ORDER — DEXAMETHASONE SODIUM PHOSPHATE 4 MG/ML
INJECTION, SOLUTION INTRA-ARTICULAR; INTRALESIONAL; INTRAMUSCULAR; INTRAVENOUS; SOFT TISSUE PRN
Status: DISCONTINUED | OUTPATIENT
Start: 2022-02-04 | End: 2022-02-04 | Stop reason: SDUPTHER

## 2022-02-04 RX ORDER — ONDANSETRON 2 MG/ML
INJECTION INTRAMUSCULAR; INTRAVENOUS PRN
Status: DISCONTINUED | OUTPATIENT
Start: 2022-02-04 | End: 2022-02-04 | Stop reason: SDUPTHER

## 2022-02-04 RX ORDER — ONDANSETRON 2 MG/ML
4 INJECTION INTRAMUSCULAR; INTRAVENOUS
Status: DISCONTINUED | OUTPATIENT
Start: 2022-02-04 | End: 2022-02-04 | Stop reason: HOSPADM

## 2022-02-04 RX ORDER — GLYCOPYRROLATE 0.2 MG/ML
INJECTION INTRAMUSCULAR; INTRAVENOUS PRN
Status: DISCONTINUED | OUTPATIENT
Start: 2022-02-04 | End: 2022-02-04 | Stop reason: SDUPTHER

## 2022-02-04 RX ORDER — LABETALOL HYDROCHLORIDE 5 MG/ML
5 INJECTION, SOLUTION INTRAVENOUS EVERY 10 MIN PRN
Status: DISCONTINUED | OUTPATIENT
Start: 2022-02-04 | End: 2022-02-04 | Stop reason: HOSPADM

## 2022-02-04 RX ORDER — MIDAZOLAM HYDROCHLORIDE 1 MG/ML
INJECTION INTRAMUSCULAR; INTRAVENOUS PRN
Status: DISCONTINUED | OUTPATIENT
Start: 2022-02-04 | End: 2022-02-04 | Stop reason: SDUPTHER

## 2022-02-04 RX ORDER — HYDROMORPHONE HCL 110MG/55ML
PATIENT CONTROLLED ANALGESIA SYRINGE INTRAVENOUS PRN
Status: DISCONTINUED | OUTPATIENT
Start: 2022-02-04 | End: 2022-02-04 | Stop reason: SDUPTHER

## 2022-02-04 RX ORDER — FENTANYL CITRATE 50 UG/ML
INJECTION, SOLUTION INTRAMUSCULAR; INTRAVENOUS PRN
Status: DISCONTINUED | OUTPATIENT
Start: 2022-02-04 | End: 2022-02-04 | Stop reason: SDUPTHER

## 2022-02-04 RX ORDER — SODIUM CHLORIDE 0.9 % (FLUSH) 0.9 %
10 SYRINGE (ML) INJECTION PRN
Status: DISCONTINUED | OUTPATIENT
Start: 2022-02-04 | End: 2022-02-04 | Stop reason: HOSPADM

## 2022-02-04 RX ORDER — ROCURONIUM BROMIDE 10 MG/ML
INJECTION, SOLUTION INTRAVENOUS PRN
Status: DISCONTINUED | OUTPATIENT
Start: 2022-02-04 | End: 2022-02-04 | Stop reason: SDUPTHER

## 2022-02-04 RX ORDER — HYDROCODONE BITARTRATE AND ACETAMINOPHEN 10; 325 MG/1; MG/1
1 TABLET ORAL EVERY 4 HOURS PRN
Qty: 30 TABLET | Refills: 0 | Status: SHIPPED | OUTPATIENT
Start: 2022-02-04 | End: 2022-02-11

## 2022-02-04 RX ORDER — SODIUM CHLORIDE 0.9 % (FLUSH) 0.9 %
10 SYRINGE (ML) INJECTION EVERY 12 HOURS SCHEDULED
Status: DISCONTINUED | OUTPATIENT
Start: 2022-02-04 | End: 2022-02-04 | Stop reason: HOSPADM

## 2022-02-04 RX ORDER — LIDOCAINE HYDROCHLORIDE 10 MG/ML
0.5 INJECTION, SOLUTION EPIDURAL; INFILTRATION; INTRACAUDAL; PERINEURAL ONCE
Status: DISCONTINUED | OUTPATIENT
Start: 2022-02-04 | End: 2022-02-04 | Stop reason: HOSPADM

## 2022-02-04 RX ORDER — PROPOFOL 10 MG/ML
INJECTION, EMULSION INTRAVENOUS PRN
Status: DISCONTINUED | OUTPATIENT
Start: 2022-02-04 | End: 2022-02-04 | Stop reason: SDUPTHER

## 2022-02-04 RX ORDER — HYDROCODONE BITARTRATE AND ACETAMINOPHEN 5; 325 MG/1; MG/1
1 TABLET ORAL
Status: COMPLETED | OUTPATIENT
Start: 2022-02-04 | End: 2022-02-04

## 2022-02-04 RX ORDER — HYDROMORPHONE HCL 110MG/55ML
0.5 PATIENT CONTROLLED ANALGESIA SYRINGE INTRAVENOUS EVERY 5 MIN PRN
Status: DISCONTINUED | OUTPATIENT
Start: 2022-02-04 | End: 2022-02-04 | Stop reason: HOSPADM

## 2022-02-04 RX ADMIN — CEFAZOLIN SODIUM 2000 MG: 10 INJECTION, POWDER, FOR SOLUTION INTRAVENOUS at 12:13

## 2022-02-04 RX ADMIN — ROCURONIUM BROMIDE 10 MG: 10 INJECTION, SOLUTION INTRAVENOUS at 08:41

## 2022-02-04 RX ADMIN — HYDROMORPHONE HYDROCHLORIDE 0.4 MG: 2 INJECTION, SOLUTION INTRAMUSCULAR; INTRAVENOUS; SUBCUTANEOUS at 10:17

## 2022-02-04 RX ADMIN — ONDANSETRON 4 MG: 2 INJECTION INTRAMUSCULAR; INTRAVENOUS at 08:49

## 2022-02-04 RX ADMIN — FENTANYL CITRATE 50 MCG: 50 INJECTION, SOLUTION INTRAMUSCULAR; INTRAVENOUS at 08:40

## 2022-02-04 RX ADMIN — DEXAMETHASONE SODIUM PHOSPHATE 8 MG: 4 INJECTION, SOLUTION INTRAMUSCULAR; INTRAVENOUS at 08:48

## 2022-02-04 RX ADMIN — PHENYLEPHRINE HYDROCHLORIDE 50 MCG: 10 INJECTION INTRAVENOUS at 08:46

## 2022-02-04 RX ADMIN — TRANEXAMIC ACID 1000 MG: 1 INJECTION, SOLUTION INTRAVENOUS at 09:55

## 2022-02-04 RX ADMIN — CEFAZOLIN 2000 MG: 10 INJECTION, POWDER, FOR SOLUTION INTRAVENOUS at 08:32

## 2022-02-04 RX ADMIN — MIDAZOLAM 2 MG: 1 INJECTION INTRAMUSCULAR; INTRAVENOUS at 08:36

## 2022-02-04 RX ADMIN — VANCOMYCIN HYDROCHLORIDE 1500 MG: 10 INJECTION, POWDER, LYOPHILIZED, FOR SOLUTION INTRAVENOUS at 08:58

## 2022-02-04 RX ADMIN — FENTANYL CITRATE 50 MCG: 50 INJECTION, SOLUTION INTRAMUSCULAR; INTRAVENOUS at 08:57

## 2022-02-04 RX ADMIN — HYDROMORPHONE HYDROCHLORIDE 0.6 MG: 2 INJECTION, SOLUTION INTRAMUSCULAR; INTRAVENOUS; SUBCUTANEOUS at 09:09

## 2022-02-04 RX ADMIN — GLYCOPYRROLATE 0.4 MG: 0.2 INJECTION, SOLUTION INTRAMUSCULAR; INTRAVENOUS at 08:42

## 2022-02-04 RX ADMIN — TRANEXAMIC ACID 1000 MG: 1 INJECTION, SOLUTION INTRAVENOUS at 08:55

## 2022-02-04 RX ADMIN — GLYCOPYRROLATE 0.2 MG: 0.2 INJECTION, SOLUTION INTRAMUSCULAR; INTRAVENOUS at 08:38

## 2022-02-04 RX ADMIN — LIDOCAINE HYDROCHLORIDE 100 MG: 20 INJECTION, SOLUTION INFILTRATION; PERINEURAL at 08:41

## 2022-02-04 RX ADMIN — FAMOTIDINE 20 MG: 10 INJECTION INTRAVENOUS at 07:53

## 2022-02-04 RX ADMIN — SODIUM CHLORIDE, POTASSIUM CHLORIDE, SODIUM LACTATE AND CALCIUM CHLORIDE: 600; 310; 30; 20 INJECTION, SOLUTION INTRAVENOUS at 06:43

## 2022-02-04 RX ADMIN — PROPOFOL 200 MG: 10 INJECTION, EMULSION INTRAVENOUS at 08:42

## 2022-02-04 RX ADMIN — HYDROCODONE BITARTRATE AND ACETAMINOPHEN 1 TABLET: 5; 325 TABLET ORAL at 12:27

## 2022-02-04 RX ADMIN — SUCCINYLCHOLINE CHLORIDE 200 MG: 20 INJECTION, SOLUTION INTRAMUSCULAR; INTRAVENOUS at 08:43

## 2022-02-04 ASSESSMENT — PULMONARY FUNCTION TESTS
PIF_VALUE: 1
PIF_VALUE: 4
PIF_VALUE: 0
PIF_VALUE: 16
PIF_VALUE: 4
PIF_VALUE: 22
PIF_VALUE: 21
PIF_VALUE: 27
PIF_VALUE: 23
PIF_VALUE: 5
PIF_VALUE: 3
PIF_VALUE: 4
PIF_VALUE: 23
PIF_VALUE: 23
PIF_VALUE: 4
PIF_VALUE: 22
PIF_VALUE: 2
PIF_VALUE: 0
PIF_VALUE: 21
PIF_VALUE: 7
PIF_VALUE: 1
PIF_VALUE: 22
PIF_VALUE: 22
PIF_VALUE: 2
PIF_VALUE: 22
PIF_VALUE: 21
PIF_VALUE: 20
PIF_VALUE: 26
PIF_VALUE: 4
PIF_VALUE: 24
PIF_VALUE: 22
PIF_VALUE: 4
PIF_VALUE: 22
PIF_VALUE: 21
PIF_VALUE: 2
PIF_VALUE: 7
PIF_VALUE: 22
PIF_VALUE: 22
PIF_VALUE: 19
PIF_VALUE: 4
PIF_VALUE: 4
PIF_VALUE: 22
PIF_VALUE: 21
PIF_VALUE: 23
PIF_VALUE: 2
PIF_VALUE: 4
PIF_VALUE: 21
PIF_VALUE: 23
PIF_VALUE: 22
PIF_VALUE: 3
PIF_VALUE: 22
PIF_VALUE: 21
PIF_VALUE: 4
PIF_VALUE: 21
PIF_VALUE: 4
PIF_VALUE: 22
PIF_VALUE: 7
PIF_VALUE: 20
PIF_VALUE: 2
PIF_VALUE: 1
PIF_VALUE: 23
PIF_VALUE: 21
PIF_VALUE: 23
PIF_VALUE: 22
PIF_VALUE: 22
PIF_VALUE: 23
PIF_VALUE: 26
PIF_VALUE: 23
PIF_VALUE: 1
PIF_VALUE: 21
PIF_VALUE: 22
PIF_VALUE: 2
PIF_VALUE: 22
PIF_VALUE: 1
PIF_VALUE: 21
PIF_VALUE: 4
PIF_VALUE: 22
PIF_VALUE: 4
PIF_VALUE: 22
PIF_VALUE: 21
PIF_VALUE: 4
PIF_VALUE: 4
PIF_VALUE: 17
PIF_VALUE: 22
PIF_VALUE: 1
PIF_VALUE: 4
PIF_VALUE: 23
PIF_VALUE: 22
PIF_VALUE: 1
PIF_VALUE: 29

## 2022-02-04 ASSESSMENT — PAIN DESCRIPTION - PROGRESSION: CLINICAL_PROGRESSION: GRADUALLY IMPROVING

## 2022-02-04 ASSESSMENT — PAIN SCALES - GENERAL
PAINLEVEL_OUTOF10: 5
PAINLEVEL_OUTOF10: 8

## 2022-02-04 ASSESSMENT — ENCOUNTER SYMPTOMS: SHORTNESS OF BREATH: 0

## 2022-02-04 ASSESSMENT — PAIN DESCRIPTION - ONSET: ONSET: OTHER (COMMENT)

## 2022-02-04 ASSESSMENT — PAIN - FUNCTIONAL ASSESSMENT: PAIN_FUNCTIONAL_ASSESSMENT: 0-10

## 2022-02-04 ASSESSMENT — PAIN DESCRIPTION - ORIENTATION
ORIENTATION: LEFT
ORIENTATION: LEFT

## 2022-02-04 ASSESSMENT — PAIN DESCRIPTION - DESCRIPTORS
DESCRIPTORS: SHARP;DISCOMFORT
DESCRIPTORS: DISCOMFORT

## 2022-02-04 ASSESSMENT — PAIN DESCRIPTION - LOCATION
LOCATION: KNEE
LOCATION: KNEE

## 2022-02-04 ASSESSMENT — PAIN DESCRIPTION - FREQUENCY: FREQUENCY: CONTINUOUS

## 2022-02-04 ASSESSMENT — PAIN DESCRIPTION - PAIN TYPE
TYPE: SURGICAL PAIN
TYPE: SURGICAL PAIN

## 2022-02-04 NOTE — PROGRESS NOTES
Discharge instructions review with patient and pt daughter. All home medications have been reviewed, pt v/u. Discharge instructions signed. Pt discharged via wheelchair. Pt discharged with instructions, medications delivered by pharmacy and all belongings. Daughter taking stable pt home.

## 2022-02-04 NOTE — PROGRESS NOTES
CLINICAL PHARMACY NOTE: MEDS TO BEDS    Total # of Prescriptions Filled: 3   The following medications were delivered to the patient:  · Zofran 4 mg  · Norco   · Eliquis 2.5 mg    Additional Documentation:    Delivered to Patient=signed  Tahira Arciniega CPhT

## 2022-02-04 NOTE — PROGRESS NOTES
Pt with small amount of emesis noted after ambulating with PT; pt states she is feeling ok after emesis. PT working with pt in bed.

## 2022-02-04 NOTE — ANESTHESIA PRE PROCEDURE
Department of Anesthesiology  Preprocedure Note       Name:  Kieran [place]   Age:  39 y.o.  :  1980                                          MRN:  [id number]         Date:  2022      Surgeon: Kam Leung):  Nick Mercado MD    Procedure: Procedure(s):  LEFT KNEE PATELLOFEMORAL ARTHROPLASTY-ARTHROSURFACE    Medications prior to admission:   Prior to Admission medications    Medication Sig Start Date End Date Taking? Authorizing Provider   amphetamine-dextroamphetamine (ADDERALL XR) 30 MG extended release capsule TAKE 1 CAPSULE BY MOUTH 1 TIME A DAY IN THE MORNING 21  Yes Historical Provider, MD   FLUoxetine (PROZAC) 20 MG capsule Take 40 mg by mouth daily    Yes Historical Provider, MD   ibuprofen (ADVIL;MOTRIN) 200 MG tablet Take 200 mg by mouth every 6 hours as needed for Pain    Historical Provider, MD       Current medications:    Current Facility-Administered Medications   Medication Dose Route Frequency Provider Last Rate Last Admin    lactated ringers infusion   IntraVENous Continuous Nick Mercado MD 50 mL/hr at 22 0643 New Bag at 22 0643    lidocaine PF 1 % injection 0.5 mL  0.5 mL IntraDERmal Once Nick Mercado MD        ceFAZolin (ANCEF) 2000 mg in dextrose 5 % 50 mL IVPB  2,000 mg IntraVENous On Call to [address] Tonny Carter MD        ortho mix injection   Injection On Call Nick Mercado MD        tranexamic acid (CYKLOKAPRON) 1,000 mg in sodium chloride 0.9 % 50 mL IVPB  1,000 mg IntraVENous Once Nick Mercado MD        tranexamic acid (CYKLOKAPRON) 1,000 mg in sodium chloride 0.9 % 50 mL IVPB  1,000 mg IntraVENous On Call to [address] Tonny Carter MD           Allergies:     Allergies   Allergen Reactions    Demerol      UNKNOWN    Monistat [Tioconazole] Itching     Burning, blisters    Pineapple Itching    Tramadol      Sweating nausea dizziness       Problem List:    Patient Active Problem List   Diagnosis Code    Injury of ankle, right S99.911A    Fracture, fibula S82.409A    Chest pain R07.9    Threatened labor O47.9    Poor fetal growth affecting management of mother in third trimester O45.26    Normal labor O80, Z37.9    Poor fetal growth affecting management of mother in third trimester O36.5930    Normal vaginal delivery O80    Vaginal delivery O80    Trigger ring finger of left hand M65.342       Past Medical History:        Diagnosis Date    Abnormal Pap smear of cervix     in past, recent normal    ADHD     Anemia     Cervix dysplasia     Depression     no meds currently    Kidney stones     kidney stones    Postpartum depression     Zoloft helped       Past Surgical History:        Procedure Laterality Date    CHOLECYSTECTOMY      FINGER TRIGGER RELEASE Left 12/8/2020    LEFT RING FINGER TRIGGER FINGER RELEASE performed by Lena Meléndez MD at [facility] ARTHROSCOPY Left 10/22/2021    LEFT KNEE ARTHROSCOPY; CHONDROPLASTY;  CARTILAGE BIOPSY ([id number], [id number]) performed by Yulisa Byrne MD at [address] [place] [address] Bilateral     TONSILLECTOMY         Social History:    Social History     Tobacco Use    Smoking status: Never Smoker    Smokeless tobacco: Never Used   Substance Use Topics    Alcohol use:  No                                Counseling given: Not Answered      Vital Signs (Current):   Vitals:    01/14/22 1026 02/04/22 0621   BP:  120/81   Pulse:  57   Resp:  16   Temp:  96.1 °F (35.6 °C)   TempSrc:  Temporal   SpO2:  98%   Weight: 215 lb (97.5 kg) 256 lb (116.1 kg)   Height: 5' 8.5\" (1.74 m) 5' 8\" (1.727 m)                                              BP Readings from Last 3 Encounters:   02/04/22 120/81   01/19/22 120/80   10/22/21 138/63       NPO Status: Time of last liquid consumption: 2330                        Time of last solid consumption: 2100                        Date of last liquid consumption: 02/03/22                        Date of last solid food consumption: 02/03/22    BMI:   Wt Readings from Last 3 Encounters:   02/04/22 256 lb (116.1 kg)   01/19/22 255 lb (115.7 kg)   01/06/22 247 lb (112 kg)     Body mass index is 38.92 kg/m². CBC:   Lab Results   Component Value Date    WBC 7.0 01/31/2022    RBC 5.05 01/31/2022    HGB 12.6 01/31/2022    HCT 39.8 01/31/2022    MCV 78.8 01/31/2022    RDW 16.4 01/31/2022     01/31/2022       CMP:   Lab Results   Component Value Date     01/31/2022    K 4.1 01/31/2022     01/31/2022    CO2 23 01/31/2022    BUN 9 01/31/2022    CREATININE 0.6 01/31/2022    GFRAA >60 01/31/2022    GFRAA >60 03/12/2012    AGRATIO 1.4 01/31/2022    LABGLOM >60 01/31/2022    GLUCOSE 114 01/31/2022    PROT 6.8 01/31/2022    PROT 6.7 11/11/2010    CALCIUM 8.8 01/31/2022    BILITOT <0.2 01/31/2022    ALKPHOS 62 01/31/2022    AST 18 01/31/2022    ALT 19 01/31/2022       POC Tests: No results for input(s): POCGLU, POCNA, POCK, POCCL, POCBUN, POCHEMO, POCHCT in the last 72 hours.     Coags:   Lab Results   Component Value Date    PROTIME 10.8 01/31/2022    INR 0.96 01/31/2022    APTT 37.3 01/31/2022       HCG (If Applicable):   Lab Results   Component Value Date    PREGTESTUR Negative 02/04/2022        ABGs: No results found for: PHART, PO2ART, WDW5VQW, LIE7RGT, BEART, Y8CCKCAP     Type & Screen (If Applicable):  No results found for: LABABO, LABRH    Drug/Infectious Status (If Applicable):  No results found for: HIV, HEPCAB    COVID-19 Screening (If Applicable):   Lab Results   Component Value Date    COVID19 Not Detected 01/31/2022    COVID19 NOT DETECTED 12/02/2020           Anesthesia Evaluation  Patient summary reviewed and Nursing notes reviewed no history of anesthetic complications:   Airway: Mallampati: I  TM distance: >3 FB   Neck ROM: full  Mouth opening: > = 3 FB Dental: normal exam         Pulmonary:       (-) asthma and shortness of breath                           Cardiovascular:        (-) hypertension and  angina                Neuro/Psych:   (+) psychiatric history: stable with treatmentdepression/anxiety    (-) CVA           GI/Hepatic/Renal:        (-) GERD and liver disease       Endo/Other:        (-) diabetes mellitus, hypothyroidism               Abdominal:             Vascular:     - PVD. Other Findings:           Anesthesia Plan      general     ASA 2       Induction: intravenous. MIPS: Postoperative opioids intended and Prophylactic antiemetics administered. Anesthetic plan and risks discussed with patient. Use of blood products discussed with patient whom. Plan discussed with CRNA.                   Stefanie Reese MD   2/4/2022

## 2022-02-04 NOTE — PROGRESS NOTES
Pt awake and alert at this time. Pt on RA, and VSS. Pt denies nausea tolerating PO. Pt states pain is minimal, no distress noted. Skin warm to left foot, palpable pulses and able to wiggle left toes. Pt meets criteria to be discharged from Phase 1.

## 2022-02-04 NOTE — H&P
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 Unfortunately she continues to have pain on a daily basis. This does interfere with her overall quality of life. She has pain with standing, walking, stairs.     Past Medical History:   Diagnosis Date    Abnormal Pap smear of cervix     in past, recent normal    ADHD     Anemia     Cervix dysplasia     Depression     no meds currently    Kidney stones     kidney stones    Postpartum depression     Zoloft helped       Past Surgical History:   Procedure Laterality Date    CHOLECYSTECTOMY      FINGER TRIGGER RELEASE Left 12/8/2020    LEFT RING FINGER TRIGGER FINGER RELEASE performed by Gerardo Kelley MD at [facility] ARTHROSCOPY Left 10/22/2021    LEFT KNEE ARTHROSCOPY; CHONDROPLASTY;  CARTILAGE BIOPSY ([id number], [id number]) performed by Reed Bonilla MD at [address] [place] [address] Bilateral     TONSILLECTOMY         Scheduled Meds:   lidocaine PF  0.5 mL IntraDERmal Once    ceFAZolin  2,000 mg IntraVENous On Call to [address] [place] [address] ortho mix injection   Injection On Call    tranexamic acid (CYCLOKAPRON) IVPB  1,000 mg IntraVENous Once    tranexamic acid (CYCLOKAPRON) IVPB  1,000 mg IntraVENous On Call to OR    sodium chloride flush  10 mL IntraVENous 2 times per day     Continuous Infusions:   lactated ringers 0  (02/04/22 0807)    lactated ringers      sodium chloride       PRN Meds:.fentanNYL, HYDROmorphone, HYDROcodone 5 mg - acetaminophen, ondansetron, prochlorperazine, labetalol, hydrALAZINE, sodium chloride flush, sodium chloride, lidocaine PF     Allergies   Allergen Reactions    Demerol      UNKNOWN    Monistat [Tioconazole] Itching     Burning, blisters    Pineapple Itching    Tramadol      Sweating nausea dizziness       Vitals: 02/04/22 0621   BP: 120/81   Pulse: 57   Resp: 16   Temp: 96.1 °F (35.6 °C)   SpO2: 98%        Objective:  Ht 5' 8\" (1.727 m)   Wt 247 lb (112 kg)   BMI 37.56 kg/m²       Physical Exam:  The patient is well-appearing and in no apparent distress  CV-RRR  Pulm-CTAB    Examination of the left knee   There is a small effusion, no gross deformity or skin changes  Range of motion reveals 0 to 125  Neg lachman, negative posterior drawer, no pain or laxity with varus or valgus stress at 0 degrees and 30 degrees of flexion  no joint line tenderness  5 out of 5 strength throughout distal muscle groups  Sensation is intact to light touch throughout all distributions  There is no calf swelling or tenderness  Palpable DP pulse, brisk cap refill, 2+ symmetric reflexes     Assessment:  Status post left knee arthroscopy with chondroplasty and cartilage biopsy on 10/22/2021  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 She has a very large full-thickness chondral defect of the patella that involves the majority of the patella. 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 She has been treated to date with activity modification, anti-inflammatory medications, physical therapy, injections, and arthroscopic chondroplasty. Options at this point include cartilage restoration surgery versus partial patellofemoral arthroplasty. I did tell the patient that unfortunately cartilage restoration in the form of autologous chondrocyte implantation as well as osteochondral allograft transplantation would not be covered under her insurance. She does understand this. She would also be concerned with the prolonged recovery required with close restoring procedures. Alternative option would be patellofemoral arthroplasty utilizing an inlay resurfacing technique. The risk and benefits of this were thoroughly discussed.   She does understand elects proceed.

## 2022-02-04 NOTE — ANESTHESIA POSTPROCEDURE EVALUATION
Department of Anesthesiology  Postprocedure Note    Patient: Gaby Abreu  MRN: [id number]  YOB: 1980  Date of evaluation: 2/4/2022  Time:  11:29 AM     Procedure Summary     Date: 02/04/22 Room / Location: [address]    Anesthesia Start: 0425 Anesthesia Stop: 0197    Procedure: LEFT KNEE PATELLOFEMORAL ARTHROPLASTY-ARTHROSURFACE (Left Knee) Diagnosis: (M17.12 LEFT PATELLOFEMORAL ARTHRITIS)    Surgeons: Waqar Hilario MD Responsible Provider: Smiley Fletcher MD    Anesthesia Type: general ASA Status: 2          Anesthesia Type: general    Martín Phase I: Martín Score: 5    Martín Phase II:      Last vitals: Reviewed and per EMR flowsheets.        Anesthesia Post Evaluation    Patient location during evaluation: PACU  Patient participation: complete - patient participated  Level of consciousness: awake and alert  Airway patency: patent  Nausea & Vomiting: no nausea and no vomiting  Complications: no  Cardiovascular status: hemodynamically stable  Respiratory status: acceptable  Hydration status: stable  Multimodal analgesia pain management approach

## 2022-02-04 NOTE — PROGRESS NOTES
Physical Therapy    Facility/Department: [facility] OR  Initial Assessment/ D/C summary    NAME: Min Ryan  : 1980  MRN: [id number]    Date of Service: 2022  Efarin Porter scored a 20/24 on the AM-PAC short mobility form. Current research shows that an AM-PAC score of 18 or greater is typically associated with a discharge to the patient's home setting. Based on the patient's AM-PAC score and their current functional mobility deficits, it is recommended that the patient have 2-3 sessions per week of Physical Therapy at d/c to increase the patient's independence. At this time, this patient demonstrates the endurance and safety to discharge home with Outpatient PT and a follow up treatment frequency of 2-3x/wk. Please see assessment section for further patient specific details. If patient discharges prior to next session this note will serve as a discharge summary. Please see below for the latest assessment towards goals. Discharge Recommendations:  Outpatient PT   PT Equipment Recommendations  Equipment Needed: Yes  Mobility Devices: [place]: Rolling  Other: Pt. needs a rolling walker to be safe with ambulation at this time    Assessment   Body structures, Functions, Activity limitations: Decreased functional mobility ; Decreased endurance;Decreased balance;Decreased strength;Decreased ROM; Increased pain  Assessment: Pt. presents following a L partial knee arthroplasty. Pt. Supervision with all functional mobility mostly d/t drowsiness from surgery. Able to ambulate 50' x 2. Instructed in HEP of ROM and quad sets and Pt. has outpatient PT visit set up in 3 days. Treatment Diagnosis: Impaired functional mobility  Prognosis: Good  Decision Making: Medium Complexity  History: as above  Exam: as above  Clinical Presentation: evolving  PT Education: Goals;PT Role;Plan of Care;Home Exercise Program;Transfer Training;Gait Training;Equipment;General Safety; Disease Specific Education; Functional Mobility Training  Patient Education: Verbalized understanding  Barriers to Learning: None  No Skilled PT: Safe to return home  REQUIRES PT FOLLOW UP: No (as outpatient)  Activity Tolerance  Activity Tolerance: Patient Tolerated treatment well  Activity Tolerance: Did feel a bit nauseated and vomited toward end of session. RN aware       Patient Diagnosis(es): The encounter diagnosis was Preop testing. has a past medical history of Abnormal Pap smear of cervix, ADHD, Anemia, Cervix dysplasia, Depression, Kidney stones, and Postpartum depression. has a past surgical history that includes Tonsillectomy; LASIK (Bilateral); Cholecystectomy; Finger trigger release (Left, 12/8/2020); and Knee arthroscopy (Left, 10/22/2021). 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 Continues to have pain, To OR 2/4 for partial knee arthroplasty L LE. Vision/Hearing  Vision: Within Functional Limits  Hearing: Within functional limits     Subjective  General  Chart Reviewed: Yes  Response To Previous Treatment: Patient with no complaints from previous session.   Family / Caregiver Present: No  Referring Practitioner: Dr. Kim Mckeon  Referral Date : 02/04/22  Diagnosis: L partial knee arthroplasty  Follows Commands: Within Functional Limits  Other (Comment): although continues to be drowsy from anesthesia  General Comment  Comments: Supine in PACU, agreeable to therapy, c/o 8/10 pain in L knee and RN administered medication          Orientation  Orientation  Overall Orientation Status: Within Functional Limits  Social/Functional History  Social/Functional History  Lives With: Spouse,Son  Type of Home: House  Home Layout: One level  Home Access: Stairs to enter with rails  Entrance Stairs - Number of Steps: 1 curb step  Entrance Stairs - Rails: None  Bathroom Toilet: Standard  Home Equipment: Rolling walker (Was delivered prior to my arrival)  ADL Assistance: [address] [place] [address] Avenue: Independent  Homemaking Responsibilities: Yes  Ambulation Assistance: Independent  Transfer Assistance: Independent  Active : Yes  Mode of Transportation: Car  Cognition        Objective     Observation/Palpation  Posture: Good  Edema: mild, ACE wrap and bulky dressing covering incision    AROM RLE (degrees)  RLE AROM: WNL  AROM LLE (degrees)  LLE General AROM: Grossly lacks a few degrees of ext  and flexes to ~ 90 degrees, a bit limited by bulky dressing  AROM RUE (degrees)  RUE AROM : WNL  AROM LUE (degrees)  LUE AROM : WNL  Strength RLE  Strength RLE: WFL  Strength LLE  Comment: Hip and ankle WFL, fair quad set  Tone RLE  RLE Tone: Normotonic  Tone LLE  LLE Tone: Normotonic  Sensation  Overall Sensation Status: WFL  Bed mobility  Bridging: Supervision  Supine to Sit: Supervision  Sit to Supine: Supervision (Able to get LE into bed)  Scooting: Supervision  Transfers  Sit to Stand: Supervision  Stand to sit: Supervision  Comment: Discussed how to complete safe car transfer  Ambulation  Ambulation?: Yes  More Ambulation?: Yes  Ambulation 1  Surface: level tile  Device: Rolling Walker  Assistance: Supervision  Quality of Gait: Steady, step through pattern  Gait Deviations: Slow [place];Decreased step length;Decreased step height  Distance: ~ 50' x 2  Stairs/Curb  Stairs?: No (Described how to ascend and descend 1 curb step which is all required to enter home.   Pt. verbalized understanding)     Balance  Posture: Good  Sitting - Static: Good  Sitting - Dynamic: Good  Standing - Static: Good  Standing - Dynamic:

## 2022-02-07 ENCOUNTER — TELEPHONE (OUTPATIENT)
Dept: INPATIENT UNIT | Age: 42
End: 2022-02-07

## 2022-02-07 NOTE — TELEPHONE ENCOUNTER
Spoke with Patient regarding post discharge from hospital.  May have \"over done\" it yesterday, only taking ibuprofen and acetaminophen. Only taking oxy at night, has a 1year old child she is caring for and feels unable to take during daytime. Moving around well. Incision status: no drainage, odor, or redness noted per patient    Edema/Swelling:  :mild    Pain level and status: tolerable, had more pain yesterday. Use of pain medications: ibuprofen and actaminophen ; Stated they are taking their pain medication as prescribed. Reminded patient due to state law, we will be unable to refill pain pills early, so takes smallest dose possible and call the office if prescription refill is needed past 7 days. Use of ice therapy: Yes     Blood thinner: ELIQUIS ; Verified with patient that they are taking their anticoagulant as prescribed 2 a day. Bowels: Yes     Outpatient therapy: yes, appt tomorrow. Do you have all of your medications: Yes    Changes in medications: no    Using Incentive Spirometer?: No: POD #3    Did you Attend Pre-hab? no    Follow up Appointment Made: yes    How was your care while you were in the hospital? Great. Reminded patient that they will be getting a survey in the mail and we appreciate their feedback and taking the time to answer all the questions and return. No other questions/concerns at this time. Follow up appointment confirmed. Encouraged patient to call Orthopedic Nurse Mario Redd (#[phone]) or Orthopedic office if has any questions/concerns.        Follow up appointments:    Future Appointments   Date Time Provider Cyrus Dumont   2/8/2022  7:30 AM [address] [place] [address]   2/17/2022  1:00 PM MD OTILIA Ruiz [facility]

## 2022-02-07 NOTE — OP NOTE
Operative Note      Patient: [place]  YOB: 1980  MRN: [id number]    Date of Procedure: 2/4/2022    Pre-Op Diagnosis: M17.12 LEFT PATELLOFEMORAL ARTHRITIS    Post-Op Diagnosis: Same       Procedure(s):  LEFT KNEE PATELLOFEMORAL ARTHROPLASTY-ARTHROSURFACE    Surgeon(s):  Nichole Sampson MD    Assistant:   Surgical Assistant: Consuelo Magana    Anesthesia: General    Estimated Blood Loss (mL): Minimal    Complications: None    Specimens:   * No specimens in log *    Implants:  Implant Name Type Inv. Item Serial No.  Lot No. LRB No. Used Action   STUD FIX L17MM DIA8. 5MM PATELLOFEMORAL JT UHMWPE TAPR POST - [id number]  STUD FIX L17MM DIA8. 5MM PATELLOFEMORAL JT UHMWPE TAPR POST  ARTHROSURFACE-WD [id number] Left 1 Implanted   COMPONENT ARTC 3X2. 5MM OFFSET PATELLOFEMORAL HEMICAP - [id number]  COMPONENT ARTC 3X2. 5MM OFFSET PATELLOFEMORAL HEMICAP  ARTHROSURFACE-WD [id number] Left 1 Implanted   CEMENT BNE 40GM FULL DOSE PMMA W/ GENT HI VISC RADPQ LNG - [id number]  CEMENT BNE 40GM FULL DOSE PMMA W/ GENT HI VISC RADPQ LNG  [employer]WD [id number] Left 1 Implanted   COMPONENT PAT 25MM OFFSET HEMICAP - [id number]  COMPONENT PAT 25MM OFFSET HEMICAP  ARTHROSURFACE-WD [id number] Left 1 Implanted         Drains: * No LDAs found *    Findings: per dictation    Detailed Description of Procedure:      Detailed Description of Procedure:   Unfortunately patient remains symptomatic.  She has a very large full-thickness chondral defect of the patella that involves the majority of the patella.  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  She has been treated to date with activity modification, anti-inflammatory medications, physical therapy, injections, and arthroscopic chondroplasty.  Options at this point include cartilage restoration surgery versus partial patellofemoral arthroplasty.  I did tell the patient that unfortunately cartilage restoration in the form of autologous chondrocyte implantation as well as osteochondral allograft transplantation would not be covered under her insurance. Kareem Blake does understand this. Kareem Blake would also be concerned with the prolonged recovery required with close restoring procedures.  Alternative option would be patellofemoral arthroplasty utilizing an inlay resurfacing technique.  The risk and benefits of this were thoroughly discussed.  She does understand elects proceed.     The patient was seen and evaluated in the preoperative area. The surgical site was identified and marked. Informed consent was obtained. She was taken back to the operative room and placed in the table in supine position. General esthesia was performed. Thigh tourniquet was placed. The lower extremity was prepped and draped using standard sterile technique. Preoperative antibiotics were given within 30 minutes of skin incision. At that point the tourniquet was insufflated 250 mmHg. Anterior based incision was made. This extended from the superior pole of patella down toward the tibial tubercle. Full-thickness flaps were developed. Medial parapatellar incision was made. We then accessed the trochlea. We used the arthro surface system. We prepared our trochlear component. We elected to proceed with final trochlear left hemicap 3 x 2.5mm by Arthrosurface. This was well fixated within the central post.  It was flush with the surrounding cartilage. We then focused our attention to the patella. There was diffuse chondral loss throughout the patella. The patella was prepared with the appropriate reamer. We then trialed a 25 mm polyethylene component. This fit appropriately. The cement was mixed and placed within the subchondral bed. This was followed by placement of the 25 mm anatomic patella component by Arthrosurface. The cement solidified.   At that point the knee was taken through a range of motion with towel clips on the fascial layer to simulate closure. Excellent patellar tracking was noted. Ortho mixture was injected into the soft tissues with a total of 100 mL. The tourniquet is deflated. Hemostasis was achieved. Closure was performed. #1 Vicryl for the fascial layer in a figure-of-eight fashion. Subtenons layer was closed using 2-0 Vicryl in buried fashion. Skin was closed using staples. Sterile dressing was placed in the form of Xeroform, 4 x 4's, ABD, web roll, and Ace. She was successfully extubated taken recovery room in excellent condition. Then the procedure all counts were correct and I was present for the entire case.     Electronically signed by John Mirza MD on 2/7/2022 at 10:40 AM

## 2022-02-08 ENCOUNTER — HOSPITAL ENCOUNTER (OUTPATIENT)
Dept: PHYSICAL THERAPY | Age: 42
Setting detail: THERAPIES SERIES
Discharge: HOME | End: 2022-02-08
Payer: MEDICARE

## 2022-02-08 NOTE — PROGRESS NOTES
Physical Therapy  Cancellation/No-show Note  Patient Name:  Pierre Rios  :  1980   Date:  2022  Cancelled visits to date: 0  No-shows to date: 1 (Eval)    Patient status for today's appointment patient:  []  Cancelled  []  Rescheduled appointment  [x]  No-show 22     Reason given by patient:  []  Patient ill  []  Conflicting appointment  []  No transportation    []  Conflict with work  [x]  No reason given  []  Other:     Comments:      Phone call information:   []  Phone call made today to patient at _ time at number provided:      []  Patient answered, conversation as follows:    []  Patient did not answer, message left as follows:  [x]  Phone call not made today  []  Phone call not needed - pt contacted us to cancel and provided reason for cancellation.      Electronically signed by:  Sravan Tolbert PT

## 2022-02-15 ENCOUNTER — HOSPITAL ENCOUNTER (OUTPATIENT)
Dept: PHYSICAL THERAPY | Age: 42
Setting detail: THERAPIES SERIES
Discharge: HOME | End: 2022-02-15
Payer: MEDICARE

## 2022-02-15 NOTE — PROGRESS NOTES
Physical Therapy  Cancellation/No-show Note  Patient Name:  Marcel Neely  :  1980   Date:  2/15/2022  Cancelled visits to date: 1 (Eval)  No-shows to date: 1 (Eval)    Patient status for today's appointment patient:  [x]  Cancelled 2/15/22  []  Rescheduled appointment  []  No-show 22     Reason given by patient:  []  Patient ill  []  Conflicting appointment  [x]  No transportation    []  Conflict with work  []  No reason given  []  Other:     Comments:      Phone call information:   []  Phone call made today to patient at _ time at number provided:      []  Patient answered, conversation as follows:    []  Patient did not answer, message left as follows:  [x]  Phone call not made today  []  Phone call not needed - pt contacted us to cancel and provided reason for cancellation.      Electronically signed by:  Candida Ram PT

## 2022-02-16 ENCOUNTER — HOSPITAL ENCOUNTER (OUTPATIENT)
Dept: PHYSICAL THERAPY | Age: 42
Setting detail: THERAPIES SERIES
Discharge: HOME | End: 2022-02-16
Payer: MEDICARE

## 2022-02-16 DIAGNOSIS — M17.12 PATELLOFEMORAL ARTHRITIS OF LEFT KNEE: Primary | ICD-10-CM

## 2022-02-16 PROCEDURE — 97016 VASOPNEUMATIC DEVICE THERAPY: CPT

## 2022-02-16 PROCEDURE — 97530 THERAPEUTIC ACTIVITIES: CPT

## 2022-02-16 PROCEDURE — 97161 PT EVAL LOW COMPLEX 20 MIN: CPT

## 2022-02-16 PROCEDURE — 97110 THERAPEUTIC EXERCISES: CPT

## 2022-02-16 NOTE — FLOWSHEET NOTE
Jacqueline Redmond  Phone: [phone]   Fax: [phone]    Physical Therapy Treatment Note/ Progress Report:   Date:  2022    Patient Name:  Boyd Rodriguez    :  1980  MRN: [id number]  Medical/Treatment Diagnosis Information:  Diagnosis: M17.12 (ICD-10-CM) - Patellofemoral arthritis of left knee  Treatment Diagnosis: L knee pain, abnormal gait, decreased L LE stregnth, quad atrophy; s/p L patellofemoral arthroplasty on 9/3/84  Insurance/Certification information:  PT Insurance Information: paramount, no copay or auth, coins 100%, no OOP or ded, 30 visits  Physician Information:  Referring Practitioner: Dr. Shelley Baker of care signed (Y/N): []  Yes [x]  No     Date of Patient follow up with Physician:      Progress Report: []  Yes  [x]  No     Date Range for reporting period:  Beginnin22  Ending:     Progress report due (10 Rx/or 30 days whichever is less): visit #10 or  (date)     Recertification due (POC duration/ or 90 days whichever is less): visit #24 or 22 (date)     Visit # Insurance Allowable Auth required?  Date Range    []  Yes  [x]  No        Latex Allergy:  [x]NO      []YES  Preferred Language for Healthcare:   [x]English       []other:    Functional Scale:        Date assessed:  LEFS: raw score = 16; dysfunction = 80%   22    Pain level:  310     SUBJECTIVE:  See eval    OBJECTIVE: See eval    Girth (cm) L - pre-vaso / post-vaso R - pre-vaso / post-vaso   patellar     Suprapatellar         RESTRICTIONS/PRECAUTIONS: s/p L patellofemoral arthroplasty 22    Exercises/Interventions:     Therapeutic Exercises  Resistance / level Sets/sec Reps Notes   Ankle pumps       Gastroc stretch - seated w/ towel pull    HS stretch - seated  30\" 3    Heel slides - knee flexion PROM  10\" 10    Prone tke  10\" 10    SLR - flexion  1 10    SLR - abduction  1 10                                Neuromuscular allow for proper function of core, proximal hip and LE with self care and ADLs  [] (97484) Gait Re-education- Provided training and instruction to the patient for proper LE, core and proximal hip recruitment and positioning and eccentric body weight control with ambulation re-education including up and down stairs     Home Exercise Program:    [x] (55899) Reviewed/Progressed HEP activities related to strengthening, flexibility, endurance, ROM of core, proximal hip and LE for functional self-care, mobility, lifting and ambulation/stair navigation   [] (23820)Reviewed/Progressed HEP activities related to improving balance, coordination, kinesthetic sense, posture, motor skill, proprioception of core, proximal hip and LE for self care, mobility, lifting, and ambulation/stair navigation      Manual Treatments:  PROM / STM / Oscillations-Mobs:  G-I, II, III, IV (PA's, Inf., Post.)  [] (44802) Provided manual therapy to mobilize LE, proximal hip and/or LS spine soft tissue/joints for the purpose of modulating pain, promoting relaxation,  increasing ROM, reducing/eliminating soft tissue swelling/inflammation/restriction, improving soft tissue extensibility and allowing for proper ROM for normal function with self care, mobility, lifting and ambulation. Modalities:  [x] (99211) Vasopneumatic compression: Utilized vasopneumatic compression to decrease edema / swelling for the purpose of improving mobility and quad tone / recruitment which will allow for increased overall function including but not limited to self-care, transfers, ambulation, and ascending / descending stairs.        Modalities: Vaso x 10'     Charges:  Timed Code Treatment Minutes: 25   Total Treatment Minutes: 47     [x] EVAL - LOW (62833)   [] EVAL - MOD (90969)  [] EVAL - HIGH (54482)  [] RE-EVAL (01868)  [x] VB(01815) x   1  [] IONTO  [] NMR (80698) x     [x] VASO  [] Manual (96012) x     [] Other:  [x] TA x   1   [] Mech Traction (03037)  [] ES(attended) (34494)     [] ES (un) (86425):     GOALS:  Patient stated goal: play vball  [] Progressing: [] Met: [] Not Met: [] Adjusted    Therapist goals for Patient:   Short Term Goals: To be achieved in: 2 weeks  1. Independent in HEP and progression per patient tolerance, in order to prevent re-injury. [] Progressing: [] Met: [] Not Met: [] Adjusted  2. Patient will have a decrease in pain to 2/10 or less with household ambulation without AD to normalize gait pattern. [] Progressing: [] Met: [] Not Met: [] Adjusted    Long Term Goals: To be achieved in: 12 weeks  1. Disability index score of 10% or less for the LEFS to assist with reaching prior level of function. [] Progressing: [] Met: [] Not Met: [] Adjusted  2. Patient will demonstrate increased AROM to 0-120 or more to allow for functional ROM to climb in/out bathtub. [] Progressing: [] Met: [] Not Met: [] Adjusted  3. Patient will demonstrate an increase in L quad Strength to at least 5/5 to improve ability to ascend/descend stairs with reciprocal pattern. [] Progressing: [] Met: [] Not Met: [] Adjusted  4. Patient will return to normalized gait pattern without AD for 30+ min without pain to return to community ambulation. [] Progressing: [] Met: [] Not Met: [] Adjusted  5. Pt able to perform 10 SL squats with appropriate knee mechanics to indicate safe return to age appropriate recreational activity (Volleyball). [] Progressing: [] Met: [] Not Met: [] Adjusted     Overall Progression Towards Functional goals/ Treatment Progress Update:  [] Patient is progressing as expected towards functional goals listed. [] Progression is slowed due to complexities/Impairments listed. [] Progression has been slowed due to co-morbidities.   [x] Plan just implemented, too soon to assess goals progression <30days   [] Goals require adjustment due to lack of progress  [] Patient is not progressing as expected and requires additional follow up with physician  [] Other    Persisting Functional Limitations/Impairments:  []Sitting []Standing   []Walking []Squatting/bending    []Stairs []ADL's    []Transfers []Sleeping   []Housework []Job related tasks  []Driving []Sports/Recreation   []Other:    ASSESSMENT:  See eval  Treatment/Activity Tolerance:  [] Patient able to complete tx [] Patient limited by fatique  [] Patient limited by pain  [] Patient limited by other medical complications  [] Other:     Prognosis: [x] Good [] Fair  [] Poor    Patient Requires Follow-up: [x] Yes  [] No    Return to Play:    [x]  N/A   []  Stage 1: Intro to Strength   []  Stage 2: Return to Run and Strength   []  Stage 3: Return to Jump and Strength   []  Stage 4: Dynamic Strength and Agility   []  Stage 5: Sport Specific Training     []  Ready to Return to Play, Meets All Above Stages   []  Not Ready for Return to Sports   Comments:            PLAN: See eval. PT 2x / wk for 10-12 weeks. [] Continue per plan of care [] Alter current plan (see comments)  [x] Plan of care initiated [] Hold pending MD visit [] Discharge    Electronically signed by: Kvng Kilgore PT DPT, ATC    Note: If patient does not return for scheduled/ recommended follow up visits, this note will serve as a discharge from care along with most recent update on progress.

## 2022-02-16 NOTE — PLAN OF CARE
tear with non op PT. Pt is s/p L patellofemoral arthroplasty on 2/4/22. Relevant Medical History:depression  Functional Outcome: LEFS: raw score = 16; dysfunction = 80%    Pain Scale: 1-8/10  Easing factors: ice, rest  Provocative factors: bending, prolonged walking or WB     Type: []Constant   [x]Intermittent  []Radiating []Localized []other:     Numbness/Tingling: none    Occupation/School:  at [employer], works 10+ hr shifts    Prior Level of Function:Prior to this injury / incident, pt was mod independent with ADLs and IADLs d/t knee pain throughout adulthood, plays hard court and sand [employer] recreationally       OBJECTIVE:   Palpation: mild TTP lateral patella  Quad tone: mild atrophy compared bilaterally; able to maintain ext with LAQ and SLR      Bandages/Dressings/Incisions:  Incisions clean and dry, with no S&S of infection. Gait: (include devices/WB status)  normal garcía with 2WW           PROM AROM    L R L R   Knee Flexion   0    Knee Extension   105        Strength (0-5) - quad NT due to recent surgery Left Right   Hip Flexion - supine 4-    Hip Flexion - seated     Hip Abduction 4-    Hip ext 4-    Quads     Hamstrings          Flexibility - NT due to recent surgery     Hamstrings (90/90)     ITB (Rustam)     Quads (Ely's)     Hip Flexor (Jl)          Girth (cm)     infrapatella 44 41   Suprapatellar 48.5 44.5       Joint mobility:    [x]Normal    []Hypo   []Hyper    Orthopedic Special Tests:    Inessa's (-) neg    Balance: NT due to recent surgery                           [x] Patient history, allergies, meds reviewed. Medical chart reviewed. See intake form. Review Of Systems (ROS):  [x]Performed Review of systems (Integumentary, CardioPulmonary, Neurological) by intake and observation. Intake form has been scanned into medical record. Patient has been instructed to contact their primary care physician regarding ROS issues if not already being addressed at this time. Co-morbidities/Complexities (which will affect course of rehabilitation):   []None        [x]Hx of COVID   Arthritic conditions   []Rheumatoid arthritis (M05.9)  []Osteoarthritis (M19.91)  []Gout   Cardiovascular conditions   []Hypertension (I10)  []Hyperlipidemia (E78.5)  []Angina pectoris (I20)  []Atherosclerosis (I70)  []Pacemaker  []Hx of CABG/stent/  cardiac surgeries   Musculoskeletal conditions   []Disc pathology   []Congenital spine pathologies   []Osteoporosis (M81.8)  []Osteopenia (M85.8)  []Scoliosis       Endocrine conditions   []Hypothyroid (E03.9)  []Hyperthyroid Gastrointestinal conditions   []Constipation (K36.65)   Metabolic conditions   []Morbid obesity (E66.01)  []Diabetes type 1(E10.65) or 2 (E11.65)   []Neuropathy (G60.9)     Cardio/Pulmonary conditions   []Asthma (J45)  []Coughing   []COPD (J44.9)  []CHF  []A-fib   Psychological Disorders  []Anxiety (F41.9)  [x]Depression (F32.9)   []Other:   Developmental Disorders  []Autism (F84.0)  []CP (G80)  []Down Syndrome (Q90.9)  []Developmental delay     Neurological conditions  []Prior Stroke (I69.30)  []Parkinson's (G20)  []Encephalopathy (G93.40)  []MS (G35)  []Post-polio (G14)  []SCI  []TBI  []ALS Other conditions  []Fibromyalgia (M79.7)  []Vertigo  []Syncope  []Kidney Failure  []Cancer      []currently undergoing                treatment  []Pregnancy  []Incontinence   Prior surgeries  []involved limb  []previous spinal surgery  [] section birth  []hysterectomy  []bowel / bladder surgery  []other relevant surgeries   []Other:              Barriers to/and or personal factors that will affect rehab potential:              []Age  []Sex    []Smoker              []Motivation/Lack of Motivation                        []Co-Morbidities              []Cognitive Function, education/learning barriers              []Environmental, home barriers              []profession/work barriers  []past PT/medical experience  [x]other:none  Justification: Falls Risk Assessment (30 days):   [x] Falls Risk assessed and no intervention required. [] Falls Risk assessed and Patient requires intervention due to being higher risk   TUG score (>12s at risk):     [] Falls education provided, including         ASSESSMENT: 38 y/o female s/p L patellofemoral arthroplasty on 2/4/22 presents with abnormal gait, decreased standing and walking tolerance, knee swelling, decreased knee ROM, quad atrophy, L LE weakness, and knee pain. This deficits limit her ability to ambulate community distances, ascend/descend stairs with reciprocal pattern, perform housework, drive, squat, and participate in age appropatei recreational activity.   Functional Impairments:     []Noted lumbar/proximal hip/LE joint hypomobility   [x]Decreased LE functional ROM   [x]Decreased core/proximal hip strength and neuromuscular control   [x]Decreased LE functional strength   []Reduced balance/proprioceptive control   []other:      Functional Activity Limitations (from functional questionnaire and intake)   [x]Reduced ability to tolerate prolonged functional positions   [x]Reduced ability or difficulty with changes of positions or transfers between positions   [x]Reduced ability to maintain good posture and demonstrate good body mechanics with sitting, bending, and lifting   [x]Reduced ability to sleep   [] Reduced ability or tolerance with driving and/or computer work   [x]Reduced ability to perform lifting, carrying tasks   [x]Reduced ability to squat   []Reduced ability to forward bend   [x]Reduced ability to ambulate prolonged functional periods/distances/surfaces   [x]Reduced ability to ascend/descend stairs   [x]Reduced ability to run, hop, cut or jump   []other:    Participation Restrictions   [x]Reduced participation in self care activities   [x]Reduced participation in home management activities   [x]Reduced participation in work activities   [x]Reduced participation in social activities. [x]Reduced participation in sport/recreation activities. Classification :    [x]Signs/symptoms consistent with post-surgical status including decreased ROM, strength and function. []Signs/symptoms consistent with joint sprain/strain   []Signs/symptoms consistent with patella-femoral syndrome   []Signs/symptoms consistent with knee OA/hip OA   []Signs/symptoms consistent with internal derangement of knee/Hip   []Signs/symptoms consistent with functional hip weakness/NMR control      []Signs/symptoms consistent with tendinitis/tendinosis    []signs/symptoms consistent with pathology which may benefit from Dry needling      []other:      Prognosis/Rehab Potential:      []Excellent   [x]Good    []Fair   []Poor    Tolerance of evaluation/treatment:    []Excellent   [x]Good    []Fair   []Poor    Physical Therapy Evaluation Complexity Justification  [x] A history of present problem with:  [x] no personal factors and/or comorbidities that impact the plan of care;  []1-2 personal factors and/or comorbidities that impact the plan of care  []3 personal factors and/or comorbidities that impact the plan of care  [x] An examination of body systems using standardized tests and measures addressing any of the following: body structures and functions (impairments), activity limitations, and/or participation restrictions;:  [] a total of 1-2 or more elements   [] a total of 3 or more elements   [x] a total of 4 or more elements   [x] A clinical presentation with:  [] stable and/or uncomplicated characteristics   [x] evolving clinical presentation with changing characteristics  [] unstable and unpredictable characteristics;   [x] Clinical decision making of [x] low, [] moderate, [] high complexity using standardized patient assessment instrument and/or measurable assessment of functional outcome.     [x] EVAL (LOW) 95307 (typically 30 minutes face-to-face)  [] EVAL (MOD) 01391 (typically 30 minutes face-to-face)  [] EVAL (HIGH) 79951 (typically 45 minutes face-to-face)  [] RE-EVAL     PLAN:   Frequency/Duration:  2 days per week for 10-12 Weeks:  Interventions:  [x]  Therapeutic exercise including: strength training, ROM, for Lower extremity and core   [x]  NMR activation and proprioception for LE, Glutes and Core   [x]  Manual therapy as indicated for LE, Hip and spine to include: Dry Needling/IASTM, STM, PROM, Gr I-IV mobilizations, manipulation. [x] Modalities as needed that may include: thermal agents, E-stim, Biofeedback, US, iontophoresis as indicated  [x] Patient education on joint protection, postural re-education, activity modification, progression of HEP. HEP instruction: Pt provided with written and illustrated instructions for home program.   Access Code: [email or web address]  URL: [email or web address]  Date: 02/16/2022  Prepared by: Blank Monique    Exercises  Active Straight Leg Raise with Quad Set - 1 x daily - 7 x weekly - 3 sets - 10 reps  Sidelying Hip Abduction - 1 x daily - 7 x weekly - 3 sets - 10 reps  Seated Hamstring Stretch - 1 x daily - 7 x weekly - 3 sets - 30sec hold  Prone Terminal Knee Extension - 1 x daily - 7 x weekly - 10 sets - 10 sec hold  Supine Heel Slide with Strap - 1 x daily - 7 x weekly - 1 sets - 10 reps - 10 sec hold  Seated Knee Flexion Stretch - 1 x daily - 7 x weekly - 1 sets - 10 reps - 10 sec hold    GOALS:  Patient stated goal: play vball  [] Progressing: [] Met: [] Not Met: [] Adjusted    Therapist goals for Patient:   Short Term Goals: To be achieved in: 2 weeks  1. Independent in HEP and progression per patient tolerance, in order to prevent re-injury. [] Progressing: [] Met: [] Not Met: [] Adjusted  2. Patient will have a decrease in pain to 2/10 or less with household ambulation without AD to normalize gait pattern. [] Progressing: [] Met: [] Not Met: [] Adjusted    Long Term Goals: To be achieved in: 12 weeks  1.  Disability index score of 10% or less for the LEFS to assist with reaching prior level of function. [] Progressing: [] Met: [] Not Met: [] Adjusted  2. Patient will demonstrate increased AROM to 0-120 or more to allow for functional ROM to climb in/out bathtub. [] Progressing: [] Met: [] Not Met: [] Adjusted  3. Patient will demonstrate an increase in L quad Strength to at least 5/5 to improve ability to ascend/descend stairs with reciprocal pattern. [] Progressing: [] Met: [] Not Met: [] Adjusted  4. Patient will return to normalized gait pattern without AD for 30+ min without pain to return to community ambulation. [] Progressing: [] Met: [] Not Met: [] Adjusted  5. Pt able to perform 10 SL squats with appropriate knee mechanics to indicate safe return to age appropriate recreational activity (Volleyball). [] Progressing: [] Met: [] Not Met: [] Adjusted     Electronically signed by:   Jannet Westbrook, PT, DPT, ATC

## 2022-02-17 ENCOUNTER — OFFICE VISIT (OUTPATIENT)
Dept: ORTHOPEDIC SURGERY | Age: 42
End: 2022-02-17

## 2022-02-17 VITALS — HEIGHT: 68 IN | BODY MASS INDEX: 38.8 KG/M2 | WEIGHT: 256 LBS

## 2022-02-17 DIAGNOSIS — M25.562 LEFT KNEE PAIN, UNSPECIFIED CHRONICITY: Primary | ICD-10-CM

## 2022-02-17 PROCEDURE — 99024 POSTOP FOLLOW-UP VISIT: CPT | Performed by: ORTHOPAEDIC SURGERY

## 2022-02-21 NOTE — PROGRESS NOTES
2/17/2022     Reason for visit:  Status post left knee patellofemoral arthroplasty on 2/4/2022    History of Present Illness: The patient returns for postop evaluation. Overall she is doing well. She reports no major concerns. She is getting physical therapy. She is happy with her progress. No fever or chills. Objective:  Ht 5' 8\" (1.727 m)   Wt 256 lb (116.1 kg)   BMI 38.92 kg/m²      Physical Exam:  The patient is well-appearing and in no apparent distress  Examination of the left knee   There is a small effusion, no gross deformity or skin changes  Range of motion reveals 0 to 125  Neg lachman, negative posterior drawer, no pain or laxity with varus or valgus stress at 0 degrees and 30 degrees of flexion  no joint line tenderness  5 out of 5 strength throughout distal muscle groups  Sensation is intact to light touch throughout all distributions  There is no calf swelling or tenderness  Palpable DP pulse, brisk cap refill, 2+ symmetric reflexes    AP and lateral x-ray of the left knee was obtained in the office today on 2/17/2022. Patellofemoral arthroplasty components appear to be well fixated without evidence of loosening. There is no fracture dislocation    Assessment:  Status post left knee patellofemoral arthroplasty on 2/4/2022    Plan:  Patient is doing well. Continue physical therapy. She will return to see us in 4 weeks for repeat evaluation. Repeat x-rays at that time. Gerry Garnett MD            Orthopaedic Surgery Sports Medicine and [address] [facility] [address] [facility] Physician [facility] ([place]      Disclaimer: This note was dictated with voice recognition software. Though review and correction are routine, we apologize for any errors.

## 2022-02-22 ENCOUNTER — HOSPITAL ENCOUNTER (OUTPATIENT)
Dept: PHYSICAL THERAPY | Age: 42
Setting detail: THERAPIES SERIES
Discharge: HOME | End: 2022-02-22
Payer: MEDICARE

## 2022-02-22 NOTE — PROGRESS NOTES
Physical Therapy  Cancellation/No-show Note  Patient Name:  Tula Lundborg  :  1980   Date:  2022  Cancelled visits to date: 1  No-shows to date: 0    Patient status for today's appointment patient:  [x]  Cancelled   []  Rescheduled appointment  []  No-show     Reason given by patient:  []  Patient ill  []  Conflicting appointment  []  No transportation    []  Conflict with work  []  No reason given  [x]  Other:  Daughter had asthma attack   Comments:      Phone call information:   []  Phone call made today to patient at _ time at number provided:      []  Patient answered, conversation as follows:    []  Patient did not answer, message left as follows:  []  Phone call not made today  [x]  Phone call not needed - pt contacted us to cancel and provided reason for cancellation. Electronically signed by:   Timbo Hilton, PT, DPT, ATC

## 2022-02-25 ENCOUNTER — HOSPITAL ENCOUNTER (OUTPATIENT)
Dept: PHYSICAL THERAPY | Age: 42
Setting detail: THERAPIES SERIES
Discharge: HOME | End: 2022-02-25
Payer: MEDICARE

## 2022-02-25 NOTE — PROGRESS NOTES
Physical Therapy  Cancellation/No-show Note  Patient Name:  Case Potts  :  1980   Date:  2022  Cancelled visits to date: 1  No-shows to date: 1    Patient status for today's appointment patient:  []  [address]   []  Rescheduled appointment  [x]  No-show    Reason given by patient:  []  Patient ill  []  Conflicting appointment  []  No transportation    []  Conflict with work  [x]  No reason given  []  Other:     Comments:      Phone call information:   []  Phone call made today to patient at _ time at number provided:      []  Patient answered, conversation as follows:    []  Patient did not answer, message left as follows:  [x]  Phone call not made today  []  Phone call not needed - pt contacted us to cancel and provided reason for cancellation. Electronically signed by:   Arnoldo Simental, PT, DPT, ATC

## 2022-03-01 ENCOUNTER — HOSPITAL ENCOUNTER (OUTPATIENT)
Dept: PHYSICAL THERAPY | Age: 42
Setting detail: THERAPIES SERIES
Discharge: HOME | End: 2022-03-01

## 2022-03-01 NOTE — PROGRESS NOTES
Physical Therapy  Cancellation/No-show Note  Patient Name:  Regino Ruggiero  :  1980   Date:  3/1/2022  Cancelled visits to date: 2  No-shows to date: 1    Patient status for today's appointment patient:  [x]  Cancelled , 3/1  []  Rescheduled appointment  []  No-show    Reason given by patient:  []  Patient ill  []  Conflicting appointment  []  No transportation    []  Conflict with work  []  No reason given  [x]  Other:  Possible COVID    Comments:      Phone call information:   []  Phone call made today to patient at _ time at number provided:      []  Patient answered, conversation as follows:    []  Patient did not answer, message left as follows:  []  Phone call not made today  [x]  Phone call not needed - pt contacted us to cancel and provided reason for cancellation. Electronically signed by:   Sandra Santana, PT, DPT, ATC

## 2022-03-08 ENCOUNTER — HOSPITAL ENCOUNTER (OUTPATIENT)
Dept: PHYSICAL THERAPY | Age: 42
Setting detail: THERAPIES SERIES
Discharge: HOME | End: 2022-03-08

## 2022-03-08 NOTE — FLOWSHEET NOTE
Physical Therapy  Cancellation/No-show Note  Patient Name:  Kieran Washington  :  1980   Date:  3/8/2022  Cancelled visits to date: 2  No-shows to date: 2    Patient status for today's appointment patient:  []  [address] , 3/1  []  Rescheduled appointment  [x]  No-show  , 3/8  Reason given by patient:  []  Patient ill  []  Conflicting appointment  []  No transportation    []  Conflict with work  [x]  No reason given  []  Other:     Comments:      Phone call information:   [x]  Phone call made today to patient at 1:52PM at number provided:      []  Patient answered, conversation as follows:    [x]  Patient did not answer, message left as follows: Informed pt of next scheduled appt and responsibility to inform our department if she will be missing her next appt (COVID related or otherwise). Informed pt if she doesn't call before missing next session her remaining appts will be removed from schedule until she can show attendance compliance. Asked pt to call with questions or concerns. []  Phone call not made today  []  Phone call not needed - pt contacted us to cancel and provided reason for cancellation. Electronically signed by:   Jinny Hines, PT, DPT, ATC

## 2022-04-18 ENCOUNTER — TELEMEDICINE (OUTPATIENT)
Dept: PRIMARY CARE CLINIC | Age: 42
End: 2022-04-18
Payer: MEDICARE

## 2022-04-18 DIAGNOSIS — J06.9 VIRAL URI WITH COUGH: Primary | ICD-10-CM

## 2022-04-18 PROCEDURE — 99213 OFFICE O/P EST LOW 20 MIN: CPT | Performed by: NURSE PRACTITIONER

## 2022-04-18 PROCEDURE — G8428 CUR MEDS NOT DOCUMENT: HCPCS | Performed by: NURSE PRACTITIONER

## 2022-04-18 RX ORDER — OXYMETAZOLINE HYDROCHLORIDE 0.05 G/100ML
2 SPRAY NASAL 2 TIMES DAILY
Qty: 1 EACH | Refills: 3 | Status: SHIPPED | OUTPATIENT
Start: 2022-04-18 | End: 2023-04-18

## 2022-04-18 RX ORDER — BENZONATATE 200 MG/1
200 CAPSULE ORAL 3 TIMES DAILY PRN
Qty: 30 CAPSULE | Refills: 0 | Status: SHIPPED | OUTPATIENT
Start: 2022-04-18 | End: 2022-04-28

## 2022-04-18 RX ORDER — FLUTICASONE PROPIONATE 50 MCG
2 SPRAY, SUSPENSION (ML) NASAL DAILY
Qty: 16 G | Refills: 0 | Status: SHIPPED | OUTPATIENT
Start: 2022-04-18

## 2022-04-18 RX ORDER — PREDNISONE 20 MG/1
20 TABLET ORAL 2 TIMES DAILY
Qty: 10 TABLET | Refills: 0 | Status: SHIPPED | OUTPATIENT
Start: 2022-04-18 | End: 2022-04-23

## 2022-04-18 ASSESSMENT — ENCOUNTER SYMPTOMS
SORE THROAT: 1
EYE ITCHING: 0
SINUS PRESSURE: 1
WHEEZING: 1
RHINORRHEA: 1
COUGH: 1
SHORTNESS OF BREATH: 0
SINUS PAIN: 1

## 2022-04-18 NOTE — PROGRESS NOTES
Vero Porter (:  1980) is a Established patient, here for evaluation of the following:    Assessment & Plan   Below is the assessment and plan developed based on review of pertinent history, physical exam, labs, studies, and medications. 1. Viral URI with cough  -     benzonatate (TESSALON) 200 MG capsule; Take 1 capsule by mouth 3 times daily as needed for Cough, Disp-30 capsule, R-0Normal  -     predniSONE (DELTASONE) 20 MG tablet; Take 1 tablet by mouth 2 times daily for 5 days, Disp-10 tablet, R-0Normal  -     oxymetazoline (12 HOUR NASAL SPRAY) 0.05 % nasal spray; 2 sprays by Nasal route 2 times daily Do not take longer than 3 days, Disp-1 each, R-3Normal  -     fluticasone (FLONASE) 50 MCG/ACT nasal spray; 2 sprays by Each Nostril route daily, Disp-16 g, R-0Normal  Suspect parainfluenza. Explained how to contact virtualist if she develops symptoms of bacterial sinus infection (worsening symptoms, fever, facial pain, copious mucous, >10 days duration). The patient was educated on reasons to seek urgent medical care including chest pain, shortness of breath or difficulty breathing, severe pain, fever >102 not controlled by medication, persistent vomiting or diarrhea, and weakness. Patient verbalized understanding      Return if symptoms worsen or fail to improve, for viral uri with cough. Subjective   Cough  This is a new problem. Episode onset: 4 days ago. The problem has been unchanged. The problem occurs constantly. The cough is productive of purulent sputum. Associated symptoms include ear congestion, a fever, myalgias, nasal congestion, postnasal drip, rhinorrhea, a sore throat (worse with coughing) and wheezing. Pertinent negatives include no shortness of breath. Associated symptoms comments: barking. She has tried OTC cough suppressant and rest (mucinex, tylenol) for the symptoms. The treatment provided mild relief.  There is no history of asthma, bronchitis, COPD or environmental allergies. Fever reduces with tylenol, but comes right back. Lives with people who have asthma but doesn't have the diagnosis. Daughter is sick with a \"croupy cough\"  No smokers in the house  Negative home covid tests. Vaccinated for flu and covid  Patient is a single, working mother. Visit was conducted while patient was at work. Review of Systems   Constitutional: Positive for fever. HENT: Positive for congestion, postnasal drip, rhinorrhea, sinus pressure, sinus pain, sneezing and sore throat (worse with coughing). Ear pressure, feels fullness in ears   Eyes: Negative for itching. Watery eyes   Respiratory: Positive for cough and wheezing. Negative for shortness of breath. Musculoskeletal: Positive for myalgias. Allergic/Immunologic: Negative for environmental allergies.           Objective   Patient-Reported Vitals  No data recorded     Physical Exam  [INSTRUCTIONS:  \"[x]\" Indicates a positive item  \"[]\" Indicates a negative item  -- DELETE ALL ITEMS NOT EXAMINED]    Constitutional: [x] Appears well-developed and well-nourished [x] No apparent distress      [] Abnormal -     Mental status: [x] Alert and awake  [x] Oriented to person/place/time [x] Able to follow commands    [] Abnormal -     Eyes:   EOM    [x]  Normal    [] Abnormal -   Sclera  [x]  Normal    [] Abnormal -          Discharge [x]  None visible   [] Abnormal -     HENT: [x] Normocephalic, atraumatic  [x] Abnormal -mild tenderness to palpation of maxillary sinuses and posterior auricular lymph nodes   [x] Mouth/Throat: Mucous membranes are moist    External Ears [x] Normal  [] Abnormal -    Neck: [x] No visualized mass [] Abnormal -     Pulmonary/Chest: [x] Respiratory effort normal   [x] No visualized signs of difficulty breathing or respiratory distress        [] Abnormal -      Musculoskeletal:   [x] Normal gait with no signs of ataxia         [x] Normal range of motion of neck        [] Abnormal -     Neurological: [x] No Facial Asymmetry (Cranial nerve 7 motor function) (limited exam due to video visit)          [x] No gaze palsy        [] Abnormal -          Skin:        [x] No significant exanthematous lesions or discoloration noted on facial skin         [] Abnormal -            Psychiatric:       [x] Normal Affect [] Abnormal -        [x] No Hallucinations    Other pertinent observable physical exam findings:-                 Vero Porter, was evaluated through a synchronous (real-time) audio-video encounter. The patient (or guardian if applicable) is aware that this is a billable service, which includes applicable co-pays. This Virtual Visit was conducted with patient's (and/or legal guardian's) consent. The visit was conducted pursuant to the emergency declaration under the [address] [place] [address], [address] [place] [address] authority and the "[employer] and [employer] General Act. Patient identification was verified, and a caregiver was present when appropriate. The patient was located at home in a state where the provider was licensed to provide care.        --SHAI Rae - CNP

## 2022-04-18 NOTE — PATIENT INSTRUCTIONS
Patient Education        Viral Respiratory Infection: Care Instructions  Your Care Instructions     Viruses are very small organisms. They grow in number after they enter your body. There are many types that cause different illnesses, such as colds andthe mumps. The symptoms of a viral respiratory infection often start quickly. They include a fever, sore throat, and runny nose. You may also just not feel well. Or youmay not want to eat much. Most viral respiratory infections are not serious. They usually get better withtime and self-care. Antibiotics are not used to treat a viral infection. That's because antibiotics will not help cure a viral illness. In some cases, antiviral medicine can helpyour body fight a serious viral infection. Follow-up care is a key part of your treatment and safety. Be sure to make and go to all appointments, and call your doctor if you are having problems. It's also a good idea to know your test results and keep alist of the medicines you take. How can you care for yourself at home?  Rest as much as possible until you feel better.  Be safe with medicines. Take your medicine exactly as prescribed. Call your doctor if you think you are having a problem with your medicine. You will get more details on the specific medicine your doctor prescribes.  Take an over-the-counter pain medicine, such as acetaminophen (Tylenol), ibuprofen (Advil, Motrin), or naproxen (Aleve), as needed for pain and fever. Read and follow all instructions on the label. Do not give aspirin to anyone younger than 20. It has been linked to Reye syndrome, a serious illness.  Drink plenty of fluids. Hot fluids, such as tea or soup, may help relieve congestion in your nose and throat. If you have kidney, heart, or liver disease and have to limit fluids, talk with your doctor before you increase the amount of fluids you drink.  Try to clear mucus from your lungs by breathing deeply and coughing.    Gargle with warm salt water once an hour. This can help reduce swelling and throat pain. Use 1 teaspoon of salt mixed in 1 cup of warm water.  Do not smoke or allow others to smoke around you. If you need help quitting, talk to your doctor about stop-smoking programs and medicines. These can increase your chances of quitting for good. To avoid spreading the virus   Cough or sneeze into a tissue. Then throw the tissue away.  If you don't have a tissue, use your hand to cover your cough or sneeze. Then clean your hand. You can also cough into your sleeve.  Wash your hands often. Use soap and warm water. Wash for 15 to 20 seconds each time.  If you don't have soap and water near you, you can clean your hands with alcohol wipes or gel. When should you call for help? Call your doctor now or seek immediate medical care if:     You have a new or higher fever.      Your fever lasts more than 48 hours.      You have trouble breathing.      You have a fever with a stiff neck or a severe headache.      You are sensitive to light.      You feel very sleepy or confused. Watch closely for changes in your health, and be sure to contact your doctor if:     You do not get better as expected. Where can you learn more? Go to https://[employer]. org and sign in to your [employer] (T[employer]) account. Enter R840 in the [facility] box to learn more about \"Viral Respiratory Infection: Care Instructions. \"     If you do not have an account, please click on the \"Sign Up Now\" link. Current as of: July 6, 2021               Content Version: 13.2  © 2006-2022 [employer] Inc[employer]. Care instructions adapted under license by [facility] ([facility]). If you have questions about a medical condition or this instruction, always ask your healthcare professional. [place] [address] any warranty or liability for your use of this information.

## 2022-04-18 NOTE — LETTER
I had the pleasure of seeing Leona Oleary today for a primary care virtualist video visit secondary to viral uri with cough. I have provided the following recommendations: symptom management, close follow up with pcp and virtualist if symptoms worsen. I have included my note for your review and have asked the patient to follow up with you on an as needed basis (no appointment was scheduled due to patient's work schedule). If you have questions, please reach out via [employer] secure messaging by searching for the [facility] Primary Care Virtualists. Your communication will be answered promptly by the Virtualist on service for the day. Additionally, we would love your overall feedback on this visit. Please hit shift and click the following link to let us know if the Virtualist service met your expectations. [email or web address]      Electronically signed by SHAI Field CNP on 4/18/22 at 9:58 AM DUC

## 2022-06-20 ENCOUNTER — NURSE TRIAGE (OUTPATIENT)
Dept: OTHER | Facility: CLINIC | Age: 42
End: 2022-06-20

## 2022-06-20 NOTE — TELEPHONE ENCOUNTER
Received call from Edenilson Goetz at [facility] with Red Flag Complaint. Subjective: Caller states \"I've been having chest pain for the last 3 weeks. Last night I was afraid to go to bed. I took at nitro last night and it eased up but gave me a massive headache. \"     Current Symptoms: Chest pain radiating to arm/shoulder, numbness left arm, SOB with exertion   3-5 min  Onset: x3 weeks     Associated Symptoms: NA    Pain Severity: 2-3/10; intermittent- gets sharp pains at times     Temperature: Denies fever and feeling feverish/chills    What has been tried: Nitro      LMP: NA Pregnant: No    Recommended disposition: Go to ED Now    Care advice provided, patient verbalizes understanding; denies any other questions or concerns; instructed to call back for any new or worsening symptoms. Patient/caller agrees to proceed to the Emergency Department     Attention Provider: Thank you for allowing me to participate in the care of your patient. The patient was connected to triage in response to information provided to the ECC/PSC. Please do not respond through this encounter as the response is not directed to a shared pool.     Reason for Disposition   Pain also in shoulder(s) or arm(s) or jaw    Protocols used: CHEST PAIN-ADULT-[place]

## 2022-10-15 ENCOUNTER — HOSPITAL ENCOUNTER (OUTPATIENT)
Dept: WOMENS IMAGING | Age: 42
Discharge: HOME | End: 2022-10-15
Payer: MEDICARE

## 2022-10-15 VITALS — WEIGHT: 241 LBS | BODY MASS INDEX: 37.83 KG/M2 | HEIGHT: 67 IN

## 2022-10-15 DIAGNOSIS — Z12.31 VISIT FOR SCREENING MAMMOGRAM: ICD-10-CM

## 2022-10-15 DIAGNOSIS — Z12.31 ENCOUNTER FOR MAMMOGRAM TO ESTABLISH BASELINE MAMMOGRAM: ICD-10-CM

## 2022-10-15 PROCEDURE — 77063 BREAST TOMOSYNTHESIS BI: CPT

## 2022-10-31 ENCOUNTER — OFFICE VISIT (OUTPATIENT)
Dept: FAMILY MEDICINE CLINIC | Age: 42
End: 2022-10-31
Payer: MEDICARE

## 2022-10-31 VITALS
BODY MASS INDEX: 37.9 KG/M2 | TEMPERATURE: 98.3 F | HEART RATE: 72 BPM | OXYGEN SATURATION: 98 % | WEIGHT: 242 LBS | DIASTOLIC BLOOD PRESSURE: 70 MMHG | SYSTOLIC BLOOD PRESSURE: 110 MMHG | RESPIRATION RATE: 16 BRPM

## 2022-10-31 DIAGNOSIS — Z00.00 ANNUAL PHYSICAL EXAM: ICD-10-CM

## 2022-10-31 DIAGNOSIS — Z00.00 ANNUAL PHYSICAL EXAM: Primary | ICD-10-CM

## 2022-10-31 DIAGNOSIS — Z01.818 PRE-OP EXAM: ICD-10-CM

## 2022-10-31 LAB
BASOPHILS ABSOLUTE: 0.1 K/UL (ref 0–0.2)
BASOPHILS RELATIVE PERCENT: 0.8 %
EOSINOPHILS ABSOLUTE: 0.1 K/UL (ref 0–0.6)
EOSINOPHILS RELATIVE PERCENT: 1.1 %
HCT VFR BLD CALC: 37.7 % (ref 36–48)
HEMOGLOBIN: 12.7 G/DL (ref 12–16)
LYMPHOCYTES ABSOLUTE: 2.8 K/UL (ref 1–5.1)
LYMPHOCYTES RELATIVE PERCENT: 29.5 %
MCH RBC QN AUTO: 28.4 PG (ref 26–34)
MCHC RBC AUTO-ENTMCNC: 33.7 G/DL (ref 31–36)
MCV RBC AUTO: 84.2 FL (ref 80–100)
MONOCYTES ABSOLUTE: 0.5 K/UL (ref 0–1.3)
MONOCYTES RELATIVE PERCENT: 5.7 %
NEUTROPHILS ABSOLUTE: 6 K/UL (ref 1.7–7.7)
NEUTROPHILS RELATIVE PERCENT: 62.9 %
PDW BLD-RTO: 14.5 % (ref 12.4–15.4)
PLATELET # BLD: 266 K/UL (ref 135–450)
PMV BLD AUTO: 8.7 FL (ref 5–10.5)
RBC # BLD: 4.48 M/UL (ref 4–5.2)
WBC # BLD: 9.5 K/UL (ref 4–11)

## 2022-10-31 PROCEDURE — G8484 FLU IMMUNIZE NO ADMIN: HCPCS | Performed by: FAMILY MEDICINE

## 2022-10-31 PROCEDURE — 99243 OFF/OP CNSLTJ NEW/EST LOW 30: CPT | Performed by: FAMILY MEDICINE

## 2022-10-31 PROCEDURE — G8427 DOCREV CUR MEDS BY ELIG CLIN: HCPCS | Performed by: FAMILY MEDICINE

## 2022-10-31 PROCEDURE — G8417 CALC BMI ABV UP PARAM F/U: HCPCS | Performed by: FAMILY MEDICINE

## 2022-10-31 RX ORDER — FLUOXETINE HYDROCHLORIDE 40 MG/1
40 CAPSULE ORAL DAILY
COMMUNITY
Start: 2022-08-21

## 2022-10-31 ASSESSMENT — PATIENT HEALTH QUESTIONNAIRE - PHQ9
SUM OF ALL RESPONSES TO PHQ9 QUESTIONS 1 & 2: 0
SUM OF ALL RESPONSES TO PHQ QUESTIONS 1-9: 0
1. LITTLE INTEREST OR PLEASURE IN DOING THINGS: 0
SUM OF ALL RESPONSES TO PHQ QUESTIONS 1-9: 0
2. FEELING DOWN, DEPRESSED OR HOPELESS: 0
SUM OF ALL RESPONSES TO PHQ QUESTIONS 1-9: 0
SUM OF ALL RESPONSES TO PHQ QUESTIONS 1-9: 0

## 2022-10-31 NOTE — PROGRESS NOTES
[address] Note    Date: 10/31/2022                                               Maria Lezama:     Chief Complaint   Patient presents with    Pre-op Exam     B/L tubal Ligation, Dr Selwyn Wallace, 11/14/22, Katie LOUIE  Patient is here for preop exam.  Is having a bilateral tubal ligation with Dr. Zandra Edwards on 11/14/2022. Patient can walk a good distance without chest pain or shortness of breath. Can climb stairs and do moderate housework. Denies any personal history of blood clots or reactions to anesthesia. Pt's brother did have a delayed emergence from anesthesia once.          Patient Active Problem List    Diagnosis Date Noted    Trigger ring finger of left hand 11/11/2020    Poor fetal growth affecting management of mother in third trimester 12/07/2018    Normal labor 12/07/2018    Poor fetal growth affecting management of mother in third trimester 12/07/2018    Normal vaginal delivery 12/07/2018    Vaginal delivery 12/07/2018    Threatened labor 11/16/2018    Chest pain 09/18/2015    Injury of ankle, right 03/28/2014    Fracture, fibula 03/28/2014       Past Medical History:   Diagnosis Date    Abnormal Pap smear of cervix     in past, recent normal    ADHD     Anemia     Cervix dysplasia     Chronic kidney disease     Depression     no meds currently    Kidney stones     kidney stones    Obesity     Postpartum depression     Zoloft helped       Current Outpatient Medications   Medication Sig Dispense Refill    FLUoxetine (PROZAC) 40 MG capsule 40 mg daily      oxymetazoline (12 HOUR NASAL SPRAY) 0.05 % nasal spray 2 sprays by Nasal route 2 times daily Do not take longer than 3 days 1 each 3    fluticasone (FLONASE) 50 MCG/ACT nasal spray 2 sprays by Each Nostril route daily 16 g 0    amphetamine-dextroamphetamine (ADDERALL XR) 30 MG extended release capsule TAKE 1 CAPSULE BY MOUTH 1 TIME A DAY IN THE MORNING       No current facility-administered medications for this visit. Allergies   Allergen Reactions    Demerol      UNKNOWN    Monistat [Tioconazole] Itching     Burning, blisters    Pineapple Itching    Tramadol      Sweating nausea dizziness       Review of Systems  No fever, no cough, no vomiting, no dysuria, no headache, no seizure, no ankle swelling, no rash, no bruise, no LAD      Vitals:  /70   Pulse 72   Temp 98.3 °F (36.8 °C)   Resp 16   Wt 242 lb (109.8 kg)   LMP 10/20/2022   SpO2 98%   BMI 37.90 kg/m²     Wt Readings from Last 3 Encounters:   10/31/22 242 lb (109.8 kg)   10/15/22 241 lb (109.3 kg)   02/17/22 256 lb (116.1 kg)        Physical Exam  General:  Well-appearing, NAD, alert, non-toxic  HEENT:  Normocephalic, atraumatic. Pupils equal and round. TMs pearly with good landmarks. Moist mucous membranes. Normal dentition  NECK:  Supple, normal range of motion, no LAD, no meningeal signs, no JVD, nontender  CHEST/LUNGS: CTAB, no crackles, no wheeze, no rhonchi. Symmetric rise  CARDIOVASCULAR: RRR,  no murmur, no rub  ABDOMEN: Soft, non-tender, non-distended. No masses  EXTREMETIES: Normal movement of all extremities. No edema. No joint swelling. SKIN:  No rash, no cellulitis, no bruising, no petechiae/purpura/vesicles/pustules/abscess  PSYCH:  A+O x 3; normal affect  NEURO:  GCS 15, CN2-12 grossly intact, no focal motor/sensory deficits, no cerebellar deficits, normal gait, normal speech      Assessment/Plan     51-year-old female here for preop exam.  Vital signs are stable. No sign of heart failure. No sign of active infection. She is cleared for the procedure. Discharged in stable condition at 12:10 PM.    1. Annual physical exam  -     CBC with Auto Differential; Future  -     Comprehensive Metabolic Panel; Future  -     Hemoglobin A1C; Future  -     Lipid, Fasting; Future  -     TSH with Reflex; Future  2.  Pre-op exam     Orders Placed This Encounter   Procedures    CBC with Auto Differential     Standing Status:   Future     Number of Occurrences:   1     Standing Expiration Date:   10/31/2023    Comprehensive Metabolic Panel     Standing Status:   Future     Number of Occurrences:   1     Standing Expiration Date:   10/31/2023    Hemoglobin A1C     Standing Status:   Future     Number of Occurrences:   1     Standing Expiration Date:   10/31/2023    Lipid, Fasting     Standing Status:   Future     Number of Occurrences:   1     Standing Expiration Date:   10/31/2023    TSH with Reflex     Standing Status:   Future     Number of Occurrences:   1     Standing Expiration Date:   10/31/2023       No follow-ups on file.     Phuong Carney MD, MD    10/31/2022  1:15 PM

## 2022-11-01 LAB
A/G RATIO: 1.7 (ref 1.1–2.2)
ALBUMIN SERPL-MCNC: 4.4 G/DL (ref 3.4–5)
ALP BLD-CCNC: 70 U/L (ref 40–129)
ALT SERPL-CCNC: 19 U/L (ref 10–40)
ANION GAP SERPL CALCULATED.3IONS-SCNC: 17 MMOL/L (ref 3–16)
AST SERPL-CCNC: 16 U/L (ref 15–37)
BILIRUB SERPL-MCNC: <0.2 MG/DL (ref 0–1)
BUN BLDV-MCNC: 8 MG/DL (ref 7–20)
CALCIUM SERPL-MCNC: 9 MG/DL (ref 8.3–10.6)
CHLORIDE BLD-SCNC: 100 MMOL/L (ref 99–110)
CHOLESTEROL, FASTING: 232 MG/DL (ref 0–199)
CO2: 23 MMOL/L (ref 21–32)
CREAT SERPL-MCNC: 0.7 MG/DL (ref 0.6–1.1)
ESTIMATED AVERAGE GLUCOSE: 114 MG/DL
GFR SERPL CREATININE-BSD FRML MDRD: >60 ML/MIN/{1.73_M2}
GLUCOSE BLD-MCNC: 90 MG/DL (ref 70–99)
HBA1C MFR BLD: 5.6 %
HDLC SERPL-MCNC: 49 MG/DL (ref 40–60)
LDL CHOLESTEROL CALCULATED: 168 MG/DL
POTASSIUM SERPL-SCNC: 4.1 MMOL/L (ref 3.5–5.1)
SODIUM BLD-SCNC: 140 MMOL/L (ref 136–145)
TOTAL PROTEIN: 7 G/DL (ref 6.4–8.2)
TRIGLYCERIDE, FASTING: 76 MG/DL (ref 0–150)
TSH REFLEX: 1.69 UIU/ML (ref 0.27–4.2)
VLDLC SERPL CALC-MCNC: 15 MG/DL

## 2022-11-25 ENCOUNTER — NURSE TRIAGE (OUTPATIENT)
Dept: OTHER | Facility: CLINIC | Age: 42
End: 2022-11-25

## 2022-11-25 ENCOUNTER — TELEMEDICINE (OUTPATIENT)
Dept: FAMILY MEDICINE CLINIC | Age: 42
End: 2022-11-25
Payer: MEDICARE

## 2022-11-25 DIAGNOSIS — J06.9 UPPER RESPIRATORY TRACT INFECTION, UNSPECIFIED TYPE: ICD-10-CM

## 2022-11-25 DIAGNOSIS — R05.9 COUGH, UNSPECIFIED TYPE: Primary | ICD-10-CM

## 2022-11-25 PROCEDURE — G8427 DOCREV CUR MEDS BY ELIG CLIN: HCPCS | Performed by: STUDENT IN AN ORGANIZED HEALTH CARE EDUCATION/TRAINING PROGRAM

## 2022-11-25 PROCEDURE — 99213 OFFICE O/P EST LOW 20 MIN: CPT | Performed by: STUDENT IN AN ORGANIZED HEALTH CARE EDUCATION/TRAINING PROGRAM

## 2022-11-25 RX ORDER — DOXYCYCLINE HYCLATE 100 MG
100 TABLET ORAL 2 TIMES DAILY
Qty: 14 TABLET | Refills: 0 | Status: SHIPPED | OUTPATIENT
Start: 2022-11-25 | End: 2022-12-02

## 2022-11-25 RX ORDER — CETIRIZINE HYDROCHLORIDE 10 MG/1
10 TABLET ORAL DAILY
Qty: 30 TABLET | Refills: 0 | Status: SHIPPED | OUTPATIENT
Start: 2022-11-25 | End: 2022-12-25

## 2022-11-25 RX ORDER — GUAIFENESIN AND DEXTROMETHORPHAN HYDROBROMIDE 1200; 60 MG/1; MG/1
1 TABLET, EXTENDED RELEASE ORAL EVERY 12 HOURS PRN
Qty: 28 TABLET | Refills: 0 | Status: SHIPPED | OUTPATIENT
Start: 2022-11-25

## 2022-11-25 RX ORDER — BENZONATATE 100 MG/1
100-200 CAPSULE ORAL 3 TIMES DAILY PRN
Qty: 60 CAPSULE | Refills: 0 | Status: SHIPPED | OUTPATIENT
Start: 2022-11-25 | End: 2022-12-02

## 2022-11-25 ASSESSMENT — PATIENT HEALTH QUESTIONNAIRE - PHQ9
SUM OF ALL RESPONSES TO PHQ QUESTIONS 1-9: 0
SUM OF ALL RESPONSES TO PHQ QUESTIONS 1-9: 0
2. FEELING DOWN, DEPRESSED OR HOPELESS: 0
SUM OF ALL RESPONSES TO PHQ QUESTIONS 1-9: 0
1. LITTLE INTEREST OR PLEASURE IN DOING THINGS: 0
SUM OF ALL RESPONSES TO PHQ QUESTIONS 1-9: 0
SUM OF ALL RESPONSES TO PHQ9 QUESTIONS 1 & 2: 0

## 2022-11-25 NOTE — PROGRESS NOTES
[address] Note    Date: 11/25/2022      Assessment/Plan:     1. Cough, unspecified type  -     COVID-19  2. Upper respiratory tract infection, unspecified type  -     COVID-19     Doxy, mucinex dm prn, zyrtec, tessalon prn  Continue flonase  Supportive care  Warning signs disucsed  Covid test  Orders Placed This Encounter   Medications    doxycycline hyclate (VIBRA-TABS) 100 MG tablet     Sig: Take 1 tablet by mouth 2 times daily for 7 days     Dispense:  14 tablet     Refill:  0    Dextromethorphan-guaiFENesin  MG TB12     Sig: Take 1 tablet by mouth every 12 hours as needed (cough or congestion)     Dispense:  28 tablet     Refill:  0    cetirizine (ZYRTEC) 10 MG tablet     Sig: Take 1 tablet by mouth daily     Dispense:  30 tablet     Refill:  0    benzonatate (TESSALON) 100 MG capsule     Sig: Take 1-2 capsules by mouth 3 times daily as needed for Cough     Dispense:  60 capsule     Refill:  0       Orders Placed This Encounter   Procedures    [id number]    COVID-19    COVID-19    COVID-19       If any chest pain, shortness of breath, trouble breathing or other concerning symptoms to go to ER. Return if symptoms worsen or fail to improve. Due to the current coronavirus pandemic, this telephone/video visit was insisted, with patient's  (and/or legal guardian's) consent, to reduce the patient's risk of exposure to COVID-19 and provide necessary medical care. The patient was at home while the provider was either at home or at the clinic. Services were provided through a synchronous discussion over the telephone and/or video chat to substitute for in person clinic visit, and coded as such. The patient (and/or legal guardian) has also been advised to contact this office for worsening conditions or problems, and seek emergency medical treatment and/or call 911 if deemed necessary.     Discussed medication(s) risks, benefits, side effects, adverse reactions and interactions with patient. Patient voiced understanding. Subjective/Objective:     Chief Complaint   Patient presents with    Cough       HPI    Chest congestion, nose congestion, nasal sinus hurts, headache, ear hurts but thinks from face, bad cough. Not getting gmuch slep or eating much. Drinking pedialyte. Feels like it hurts to breathe. Getting enough oxygen. No wheezing  No fevers  Tried mucinex, nyquil  Going on justo 4 days  No asthma or smoking    Per chart review---  Subjective: Caller states \"I am coughing, SOB, feels like I can't get a good breath and feels like weight on chest. I have body aches and pain under my eyes, my cheeks and nose are red. \"      Current Symptoms: SOB with activity, generalized weakness, productive cough(yellow/green), body aches, face pain(cheeks and headache, chest tightness with coughing. Feels like can't get a good breath     Onset: 3 days ago; worsening    Wt Readings from Last 3 Encounters:   10/31/22 242 lb (109.8 kg)   10/15/22 241 lb (109.3 kg)   02/17/22 256 lb (116.1 kg)     There is no height or weight on file to calculate BMI.     BP Readings from Last 3 Encounters:   10/31/22 110/70   02/04/22 135/71   02/04/22 (!) 99/56     The 10-year ASCVD risk score (Lucho ULLOA, et al., 2019) is: 0.8%    Values used to calculate the score:      Age: 43 years      Sex: Female      Is Non- : No      Diabetic: No      Tobacco smoker: No      Systolic Blood Pressure: 969 mmHg      Is BP treated: No      HDL Cholesterol: 49 mg/dL      Total Cholesterol: 232 mg/dL    ROS: denies nausea/vomiting, fevers, chills, chest pain, shortness of breath, diarrhea, constipation, blood in the urine or stool         Patient Active Problem List   Diagnosis    Injury of ankle, right    Fracture, fibula    Chest pain    Threatened labor    Poor fetal growth affecting management of mother in third trimester    Normal labor    Poor fetal growth affecting management of mother in third trimester    Normal vaginal delivery    Vaginal delivery    Trigger ring finger of left hand     Past Medical History:   Diagnosis Date    Abnormal Pap smear of cervix     in past, recent normal    ADHD     Anemia     Cervix dysplasia     Chronic kidney disease     Depression     no meds currently    Kidney stones     kidney stones    Obesity     Postpartum depression     Zoloft helped       Past Surgical History:   Procedure Laterality Date    CHOLECYSTECTOMY      EYE SURGERY      FINGER TRIGGER RELEASE Left 12/08/2020    LEFT RING FINGER TRIGGER FINGER RELEASE performed by Christian Contreras MD at [facility] Left 02/04/2022    LEFT KNEE PATELLOFEMORAL ARTHROPLASTY-ARTHROSURFACE performed [facility] at [facility] ARTHROSCOPY Left 10/22/2021    LEFT KNEE ARTHROSCOPY; CHONDROPLASTY;  CARTILAGE BIOPSY (30868, [id number]) performed by Susanne Lou MD at [address] [place] [address] Bilateral     TONSILLECTOMY         Current Outpatient Medications   Medication Sig Dispense Refill    doxycycline hyclate (VIBRA-TABS) 100 MG tablet Take 1 tablet by mouth 2 times daily for 7 days 14 tablet 0    Dextromethorphan-guaiFENesin  MG TB12 Take 1 tablet by mouth every 12 hours as needed (cough or congestion) 28 tablet 0    cetirizine (ZYRTEC) 10 MG tablet Take 1 tablet by mouth daily 30 tablet 0    benzonatate (TESSALON) 100 MG capsule Take 1-2 capsules by mouth 3 times daily as needed for Cough 60 capsule 0    FLUoxetine (PROZAC) 40 MG capsule 40 mg daily      oxymetazoline (12 HOUR NASAL SPRAY) 0.05 % nasal spray 2 sprays by Nasal route 2 times daily Do not take longer than 3 days 1 each 3    fluticasone (FLONASE) 50 MCG/ACT nasal spray 2 sprays by Each Nostril route daily 16 g 0    amphetamine-dextroamphetamine (ADDERALL XR) 30 MG extended release capsule TAKE 1 CAPSULE BY MOUTH 1 TIME A DAY IN THE MORNING       No current facility-administered medications for this visit. Allergies   Allergen Reactions    Demerol      UNKNOWN    Monistat [Tioconazole] Itching     Burning, blisters    Pineapple Itching    Tramadol      Sweating nausea dizziness       Social History     Socioeconomic History    Marital status: Single     Spouse name: None    Number of children: 6    Years of education: None    Highest education level: None   Occupational History    Occupation: student    Occupation: stay at home mom   Tobacco Use    Smoking status: Never    Smokeless tobacco: Never   Vaping Use    Vaping Use: Never used   Substance and Sexual Activity    Alcohol use: No    Drug use: No    Sexual activity: Yes     Partners: Male     Family History   Problem Relation Age of Onset    Heart Failure Mother     Anemia Mother     Heart Disease Mother     High Cholesterol Mother     Obesity Mother     Heart Attack Father     Diabetes Father     Cancer Father         Lung cancer    High Blood Pressure Father     Arthritis Other     Asthma Other     Cancer Other     Diabetes Other     Heart Disease Other     High Blood Pressure Other     Kidney Disease Other     Depression Sister          Vitals: There were no vitals taken for this visit. Physical Exam   There were no vitals taken for this visit. GEN:  alert and pleasant, in NAD  HEENT:  NCAT, EOM intact, no facial asymmetry   NECK:  good range of motion  RR: in NAD over video, normal respiratory rate, talking in complete sentences without difficulty, intermittent coughing  ABD: Soft, NT per Pt self palpation  EXT: No rash or edema observed over video  NEURO: Alert oriented to person/place/date and time, normal mood and affect, able to follow commands  No focal changes over video     Giancarlo Calero MD    11/25/2022 1:07 PM    Documentation was done using voice recognition dragon software. Every effort was made to ensure accuracy; however, inadvertent, unintentional computerized transcription errors may be present.

## 2022-11-25 NOTE — TELEPHONE ENCOUNTER
Location of patient: [address] call from [facility] at "[employer] with [employer]. Subjective: Caller states \"I am coughing, SOB, feels like I can't get a good breath and feels like weight on chest. I have body aches and pain under my eyes, my cheeks and nose are red. \"     Current Symptoms: SOB with activity, generalized weakness, productive cough(yellow/green), body aches, face pain(cheeks and headache, chest tightness with coughing. Feels like can't get a good breath    Onset: 3 days ago; worsening    Associated Symptoms: reduced activity, reduced appetite, increased wakefulness    Pain Severity: 5/10; aching; waxing and waning    Temperature: none     What has been tried: mucinex, nyquil, dayquil    LMP: NA Pregnant: No    Recommended disposition: Go to Office Now    Care advice provided, patient verbalizes understanding; denies any other questions or concerns; instructed to call back for any new or worsening symptoms. Accidentally hit transfer while trying to put in wrap up code. Called patient back, but got VM and left generic message to call office back if assistance was still needed. Attention Provider: Thank you for allowing me to participate in the care of your patient. The patient was connected to triage in response to information provided to the E[facility]/PSC. Please do not respond through this encounter as the response is not directed to a shared pool.         Reason for Disposition   MILD difficulty breathing (e.g., minimal/no SOB at rest, SOB with walking, pulse < 100) of new-onset or worse than normal    Protocols used: Breathing Difficulty-ADULT-[place]

## 2022-11-26 LAB — SARS-COV-2, PCR: NOT DETECTED

## 2022-12-02 ENCOUNTER — TELEPHONE (OUTPATIENT)
Dept: FAMILY MEDICINE CLINIC | Age: 42
End: 2022-12-02

## 2022-12-02 DIAGNOSIS — B37.31 VAGINAL CANDIDA: Primary | ICD-10-CM

## 2022-12-02 RX ORDER — FLUCONAZOLE 100 MG/1
100 TABLET ORAL ONCE
Qty: 1 TABLET | Refills: 0 | Status: SHIPPED | OUTPATIENT
Start: 2022-12-02 | End: 2022-12-02

## 2022-12-02 NOTE — TELEPHONE ENCOUNTER
----- Message from Magalie Vera sent at 12/2/2022  8:40 AM EST -----  Subject: Message to Provider    QUESTIONS  Information for Provider? Patient wanting a script called in for a yeast   infection . Can not take over the counter allergic to Monistat. Started on   12/01/2022  ---------------------------------------------------------------------------  --------------  Eduardo [facility] INFO  [phone]; OK to leave message on voicemail  ---------------------------------------------------------------------------  --------------  SCRIPT ANSWERS  Relationship to Patient?  Self

## 2022-12-03 DIAGNOSIS — B37.31 VAGINAL CANDIDA: Primary | ICD-10-CM

## 2022-12-03 RX ORDER — FLUCONAZOLE 100 MG/1
100 TABLET ORAL DAILY
Qty: 1 TABLET | Refills: 0 | Status: SHIPPED | OUTPATIENT
Start: 2022-12-03 | End: 2022-12-03

## 2022-12-03 RX ORDER — FLUCONAZOLE 150 MG/1
150 TABLET ORAL ONCE
Qty: 1 TABLET | Refills: 0 | Status: SHIPPED | OUTPATIENT
Start: 2022-12-03 | End: 2022-12-03

## 2024-05-14 ASSESSMENT — PATIENT HEALTH QUESTIONNAIRE - PHQ9
SUM OF ALL RESPONSES TO PHQ QUESTIONS 1-9: 2
SUM OF ALL RESPONSES TO PHQ QUESTIONS 1-9: 2
SUM OF ALL RESPONSES TO PHQ9 QUESTIONS 1 & 2: 2
2. FEELING DOWN, DEPRESSED OR HOPELESS: SEVERAL DAYS
1. LITTLE INTEREST OR PLEASURE IN DOING THINGS: SEVERAL DAYS
2. FEELING DOWN, DEPRESSED OR HOPELESS: SEVERAL DAYS
SUM OF ALL RESPONSES TO PHQ QUESTIONS 1-9: 2
SUM OF ALL RESPONSES TO PHQ9 QUESTIONS 1 & 2: 2
1. LITTLE INTEREST OR PLEASURE IN DOING THINGS: SEVERAL DAYS
SUM OF ALL RESPONSES TO PHQ QUESTIONS 1-9: 2

## 2024-05-15 ENCOUNTER — OFFICE VISIT (OUTPATIENT)
Dept: FAMILY MEDICINE CLINIC | Age: 44
End: 2024-05-15
Payer: COMMERCIAL

## 2024-05-15 VITALS
DIASTOLIC BLOOD PRESSURE: 82 MMHG | SYSTOLIC BLOOD PRESSURE: 110 MMHG | BODY MASS INDEX: 35.88 KG/M2 | HEIGHT: 67 IN | HEART RATE: 72 BPM | TEMPERATURE: 98.1 F | OXYGEN SATURATION: 98 % | WEIGHT: 228.6 LBS

## 2024-05-15 DIAGNOSIS — R10.9 ABDOMINAL PAIN, UNSPECIFIED ABDOMINAL LOCATION: ICD-10-CM

## 2024-05-15 DIAGNOSIS — Z00.00 ANNUAL PHYSICAL EXAM: Primary | ICD-10-CM

## 2024-05-15 DIAGNOSIS — M54.2 NECK PAIN ON RIGHT SIDE: ICD-10-CM

## 2024-05-15 DIAGNOSIS — G89.29 CHRONIC RIGHT SHOULDER PAIN: ICD-10-CM

## 2024-05-15 DIAGNOSIS — R51.9 INTRACTABLE HEADACHE, UNSPECIFIED CHRONICITY PATTERN, UNSPECIFIED HEADACHE TYPE: ICD-10-CM

## 2024-05-15 DIAGNOSIS — M25.511 CHRONIC RIGHT SHOULDER PAIN: ICD-10-CM

## 2024-05-15 PROCEDURE — G8427 DOCREV CUR MEDS BY ELIG CLIN: HCPCS | Performed by: FAMILY MEDICINE

## 2024-05-15 PROCEDURE — 99396 PREV VISIT EST AGE 40-64: CPT | Performed by: FAMILY MEDICINE

## 2024-05-15 PROCEDURE — 1036F TOBACCO NON-USER: CPT | Performed by: FAMILY MEDICINE

## 2024-05-15 PROCEDURE — G8417 CALC BMI ABV UP PARAM F/U: HCPCS | Performed by: FAMILY MEDICINE

## 2024-05-15 PROCEDURE — 99214 OFFICE O/P EST MOD 30 MIN: CPT | Performed by: FAMILY MEDICINE

## 2024-05-15 RX ORDER — PREDNISONE 10 MG/1
TABLET ORAL
Qty: 30 TABLET | Refills: 0 | Status: SHIPPED | OUTPATIENT
Start: 2024-05-15

## 2024-05-15 RX ORDER — CYCLOBENZAPRINE HCL 10 MG
10 TABLET ORAL 3 TIMES DAILY PRN
Qty: 15 TABLET | Refills: 0 | Status: SHIPPED | OUTPATIENT
Start: 2024-05-15

## 2024-05-15 SDOH — ECONOMIC STABILITY: HOUSING INSECURITY
IN THE LAST 12 MONTHS, WAS THERE A TIME WHEN YOU DID NOT HAVE A STEADY PLACE TO SLEEP OR SLEPT IN A SHELTER (INCLUDING NOW)?: NO

## 2024-05-15 SDOH — ECONOMIC STABILITY: FOOD INSECURITY: WITHIN THE PAST 12 MONTHS, YOU WORRIED THAT YOUR FOOD WOULD RUN OUT BEFORE YOU GOT MONEY TO BUY MORE.: NEVER TRUE

## 2024-05-15 SDOH — ECONOMIC STABILITY: FOOD INSECURITY: WITHIN THE PAST 12 MONTHS, THE FOOD YOU BOUGHT JUST DIDN'T LAST AND YOU DIDN'T HAVE MONEY TO GET MORE.: NEVER TRUE

## 2024-05-15 SDOH — ECONOMIC STABILITY: INCOME INSECURITY: HOW HARD IS IT FOR YOU TO PAY FOR THE VERY BASICS LIKE FOOD, HOUSING, MEDICAL CARE, AND HEATING?: NOT VERY HARD

## 2024-05-15 NOTE — PROGRESS NOTES
[facility]  Clinic Note    Date: 5/15/2024                                               /Objective:     Chief Complaint   Patient presents with    Annual Exam     Right shoulder pain radiating up neck causing headache x 5 mo.     Other     X 2 days, excessive belching and diarrhea, has been on ozempic x 3wks. Patient wondering if the medication could be causing the abdominal pain        HPI  Patient is here for annual exam.  Last Tdap 2018.  Last Pap smear 10/18/2021, within normal limits. Pt denies family history of breast cancer.    Right shoulder pain started 5 months ago.  Denies injury but she uses her R arm a lot at work.  Radiates up to the neck and sometimes causes a headache on the R parietal lobe that is constant.  Does not go down the arm. Is worse with moving her right arm/raising it.  Denies numbness or weakness.    Patient started taking Ozempic for weight loss 3 weeks ago. Is having abdominal pain that has gradually gotten worse over the last 3 weeks. Is worse early in the week after taking the shot on Sunday and gets better by Friday. Missed work yesterday. Is a little better today. Pain is constant. Is located bilaterally on either side of the umbilicus. Pain is not worse with eating. +nausea, no vomiting. +diarrhea starting right after taking the shot. No blood in stool.         Patient Active Problem List    Diagnosis Date Noted    Trigger ring finger of left hand 11/11/2020    Poor fetal growth affecting management of mother in third trimester 12/07/2018    Normal labor 12/07/2018    Poor fetal growth affecting management of mother in third trimester 12/07/2018    Normal vaginal delivery 12/07/2018    Vaginal delivery 12/07/2018    Threatened labor 11/16/2018    Chest pain 09/18/2015    Injury of ankle, right 03/28/2014    Fracture, fibula 03/28/2014       Past Medical History:   Diagnosis Date    Abnormal Pap smear of cervix     in past, recent normal    ADHD     Anemia

## 2024-07-10 ENCOUNTER — HOSPITAL ENCOUNTER (OUTPATIENT)
Dept: GENERAL RADIOLOGY | Age: 44
Discharge: HOME | End: 2024-07-10
Payer: COMMERCIAL

## 2024-07-10 ENCOUNTER — HOSPITAL ENCOUNTER (OUTPATIENT)
Age: 44
Discharge: HOME | End: 2024-07-10
Payer: COMMERCIAL

## 2024-07-10 DIAGNOSIS — M25.511 CHRONIC RIGHT SHOULDER PAIN: ICD-10-CM

## 2024-07-10 DIAGNOSIS — M54.2 NECK PAIN ON RIGHT SIDE: ICD-10-CM

## 2024-07-10 DIAGNOSIS — G89.29 CHRONIC RIGHT SHOULDER PAIN: ICD-10-CM

## 2024-07-10 PROCEDURE — 72050 X-RAY EXAM NECK SPINE 4/5VWS: CPT

## 2024-07-10 PROCEDURE — 73030 X-RAY EXAM OF SHOULDER: CPT

## 2025-01-24 DIAGNOSIS — B37.31 VAGINAL YEAST INFECTION: Primary | ICD-10-CM

## 2025-01-24 RX ORDER — FLUCONAZOLE 150 MG/1
150 TABLET ORAL ONCE
Qty: 1 TABLET | Refills: 0 | Status: SHIPPED | OUTPATIENT
Start: 2025-01-24 | End: 2025-01-24

## 2025-07-02 ENCOUNTER — HOSPITAL ENCOUNTER (EMERGENCY)
Age: 45
Discharge: HOME | End: 2025-07-02

## 2025-07-02 VITALS
TEMPERATURE: 98.6 F | DIASTOLIC BLOOD PRESSURE: 84 MMHG | SYSTOLIC BLOOD PRESSURE: 148 MMHG | HEART RATE: 88 BPM | BODY MASS INDEX: 37.83 KG/M2 | RESPIRATION RATE: 16 BRPM | WEIGHT: 241 LBS | HEIGHT: 67 IN | OXYGEN SATURATION: 96 %

## 2025-07-02 DIAGNOSIS — Z77.21 EXPOSURE TO BODY FLUID: Primary | ICD-10-CM

## 2025-07-02 LAB
ALBUMIN SERPL-MCNC: 4.4 G/DL (ref 3.4–5)
ALP SERPL-CCNC: 76 U/L (ref 40–129)
ALT SERPL-CCNC: 18 U/L (ref 10–40)
ANION GAP SERPL CALCULATED.3IONS-SCNC: 15 MMOL/L (ref 3–16)
AST SERPL-CCNC: 26 U/L (ref 15–37)
BASOPHILS # BLD: 0.1 K/UL (ref 0–0.2)
BASOPHILS NFR BLD: 0.5 %
BILIRUB DIRECT SERPL-MCNC: <0.1 MG/DL (ref 0–0.3)
BILIRUB INDIRECT SERPL-MCNC: NORMAL MG/DL (ref 0–1)
BILIRUB SERPL-MCNC: <0.2 MG/DL (ref 0–1)
BUN SERPL-MCNC: 7 MG/DL (ref 7–20)
CALCIUM SERPL-MCNC: 9.5 MG/DL (ref 8.3–10.6)
CHLORIDE SERPL-SCNC: 102 MMOL/L (ref 99–110)
CO2 SERPL-SCNC: 21 MMOL/L (ref 21–32)
CREAT SERPL-MCNC: 0.7 MG/DL (ref 0.6–1.1)
DEPRECATED RDW RBC AUTO: 17.1 % (ref 12.4–15.4)
EOSINOPHIL # BLD: 0 K/UL (ref 0–0.6)
EOSINOPHIL NFR BLD: 0.2 %
GFR SERPLBLD CREATININE-BSD FMLA CKD-EPI: >90 ML/MIN/{1.73_M2}
GLUCOSE SERPL-MCNC: 117 MG/DL (ref 70–99)
HCT VFR BLD AUTO: 31.5 % (ref 36–48)
HGB BLD-MCNC: 9.8 G/DL (ref 12–16)
LYMPHOCYTES # BLD: 1.2 K/UL (ref 1–5.1)
LYMPHOCYTES NFR BLD: 9.6 %
MCH RBC QN AUTO: 19.6 PG (ref 26–34)
MCHC RBC AUTO-ENTMCNC: 31 G/DL (ref 31–36)
MCV RBC AUTO: 63.3 FL (ref 80–100)
MONOCYTES # BLD: 0.5 K/UL (ref 0–1.3)
MONOCYTES NFR BLD: 3.7 %
NEUTROPHILS # BLD: 11 K/UL (ref 1.7–7.7)
NEUTROPHILS NFR BLD: 86 %
PATH INTERP BLD-IMP: YES
PHOSPHATE SERPL-MCNC: 3.1 MG/DL (ref 2.5–4.9)
PLATELET # BLD AUTO: 293 K/UL (ref 135–450)
PMV BLD AUTO: 7.9 FL (ref 5–10.5)
POTASSIUM SERPL-SCNC: 3.6 MMOL/L (ref 3.5–5.1)
PROT SERPL-MCNC: 8 G/DL (ref 6.4–8.2)
RBC # BLD AUTO: 4.98 M/UL (ref 4–5.2)
SODIUM SERPL-SCNC: 138 MMOL/L (ref 136–145)
WBC # BLD AUTO: 12.8 K/UL (ref 4–11)

## 2025-07-02 PROCEDURE — 86702 HIV-2 ANTIBODY: CPT

## 2025-07-02 PROCEDURE — 6360000002 HC RX W HCPCS: Performed by: PHYSICIAN ASSISTANT

## 2025-07-02 PROCEDURE — 87390 HIV-1 AG IA: CPT

## 2025-07-02 PROCEDURE — 90471 IMMUNIZATION ADMIN: CPT | Performed by: PHYSICIAN ASSISTANT

## 2025-07-02 PROCEDURE — 85025 COMPLETE CBC W/AUTO DIFF WBC: CPT

## 2025-07-02 PROCEDURE — 86803 HEPATITIS C AB TEST: CPT

## 2025-07-02 PROCEDURE — 80076 HEPATIC FUNCTION PANEL: CPT

## 2025-07-02 PROCEDURE — 99284 EMERGENCY DEPT VISIT MOD MDM: CPT

## 2025-07-02 PROCEDURE — 86701 HIV-1ANTIBODY: CPT

## 2025-07-02 PROCEDURE — 90714 TD VACC NO PRESV 7 YRS+ IM: CPT | Performed by: PHYSICIAN ASSISTANT

## 2025-07-02 PROCEDURE — 86706 HEP B SURFACE ANTIBODY: CPT

## 2025-07-02 PROCEDURE — 80069 RENAL FUNCTION PANEL: CPT

## 2025-07-02 RX ADMIN — CLOSTRIDIUM TETANI TOXOID ANTIGEN (FORMALDEHYDE INACTIVATED) AND CORYNEBACTERIUM DIPHTHERIAE TOXOID ANTIGEN (FORMALDEHYDE INACTIVATED) 0.5 ML: 5; 2 INJECTION, SUSPENSION INTRAMUSCULAR at 19:41

## 2025-07-02 ASSESSMENT — ENCOUNTER SYMPTOMS
ABDOMINAL PAIN: 0
DIARRHEA: 0
SHORTNESS OF BREATH: 0
COUGH: 0
RHINORRHEA: 0
NAUSEA: 0
VOMITING: 0

## 2025-07-02 ASSESSMENT — PAIN - FUNCTIONAL ASSESSMENT: PAIN_FUNCTIONAL_ASSESSMENT: NONE - DENIES PAIN

## 2025-07-02 NOTE — ED PROVIDER NOTES
temperature 98.6 °F (37 °C), temperature source Oral, resp. rate 16, height 1.702 m (5' 7\"), weight 109.3 kg (241 lb), SpO2 96%, not currently breastfeeding.      I am the Primary Clinician of Record.  FINAL IMPRESSION      1. Exposure to body fluid          DISPOSITION/PLAN     DISPOSITION Discharge - Pending Orders Complete 07/02/2025 07:22:11 PM   DISPOSITION CONDITION Stable           PATIENT REFERRED TO:  Anthony Austin MD  [address] [facility] [address] [phone]    Schedule an appointment as soon as possible for a visit in 3 days      [facility] Emergency Department  [address] [facility] [id number] [phone]    If symptoms worsen      DISCHARGE MEDICATIONS:  New Prescriptions    No medications on file       DISCONTINUED MEDICATIONS:  Discontinued Medications    No medications on file              (Please note that portions of this note were completed with a voice recognition program.  Efforts were made to edit the dictations but occasionally words are mis-transcribed.)    Marilyn Rodas PA-C (electronically signed)        Marilyn Rodas PA-C  07/02/25 1946

## 2025-07-03 LAB
HBV SURFACE AB SERPL IA-ACNC: <3.5 MIU/ML
HCV AB SERPL QL IA: NORMAL
HIV 1+2 AB+HIV1 P24 AG SERPL QL IA: NORMAL
HIV 2 AB SERPL QL IA: NORMAL
HIV1 AB SERPL QL IA: NORMAL
HIV1 P24 AG SERPL QL IA: NORMAL
PATH INTERP BLD-IMP: NORMAL

## (undated) DEVICE — HANDPIECE SET WITH HIGH FLOW TIP AND SUCTION TUBE: Brand: INTERPULSE

## (undated) DEVICE — DRAPE HND W114XL142IN BLU POLYPR W O PCH FEN CRD AND TB HLDR

## (undated) DEVICE — MINOR SET UP PK

## (undated) DEVICE — Device

## (undated) DEVICE — BANDAGE COMPR W4INXL12FT E DISP ESMARCH EVEN

## (undated) DEVICE — STAPLER SKIN H3.9MM WIRE DIA0.58MM CRWN 6.9MM 35 STPL FIX

## (undated) DEVICE — PAD,ABDOMINAL,8"X10",ST,LF: Brand: MEDLINE

## (undated) DEVICE — BANDAGE COMPR W6INXL15FT WHT E SGL LAYERED VELC CLSR

## (undated) DEVICE — DRESSING GZ W1XL8IN COT XRFRM N ADH OVERWRAP CURAD

## (undated) DEVICE — 3 BONE CEMENT MIXER: Brand: MIXEVAC

## (undated) DEVICE — APPLICATOR MEDICATED 26 CC SOLUTION HI LT ORNG CHLORAPREP

## (undated) DEVICE — GLOVE SURG SZ 65 CRM LTX FREE POLYISOPRENE POLYMER BEAD ANTI

## (undated) DEVICE — GLOVE SURG SZ 75 L12IN FNGR THK79MIL GRN LTX FREE

## (undated) DEVICE — 4.5 MM FULL RADIUS ELITE STRAIGHT                                    DISPOSABLE BLADES, MAROON,PACKAGED 6                                    PER BOX, STERILE

## (undated) DEVICE — SPONGE LAP W18XL18IN WHT COT 4 PLY FLD STRUNG RADPQ DISP ST

## (undated) DEVICE — BNDG,ELSTC,MATRIX,STRL,6"X5YD,LF,HOOK&LP: Brand: MEDLINE

## (undated) DEVICE — SHEET,DRAPE,53X77,STERILE: Brand: MEDLINE

## (undated) DEVICE — MASC TURNOVER KIT: Brand: MEDLINE INDUSTRIES, INC.

## (undated) DEVICE — SUTURE VCRL + SZ 2-0 L18IN ABSRB UD CT1 L36MM 1/2 CIR VCP839D

## (undated) DEVICE — SYRINGE MED 30ML STD CLR PLAS LUERLOCK TIP N CTRL DISP

## (undated) DEVICE — GOWN SIRUS NONREIN XL W/TWL: Brand: MEDLINE INDUSTRIES, INC.

## (undated) DEVICE — DUAL CUT SAGITTAL BLADE

## (undated) DEVICE — STRIP,CLOSURE,WOUND,MEDI-STRIP,1/2X4: Brand: MEDLINE

## (undated) DEVICE — SOLUTION IRRIG 3000ML 0.9% SOD CHL USP UROMATIC PLAS CONT

## (undated) DEVICE — PENCIL ES ULT VAC W TELSCP NOSE EZ CLN BLDE 10FT

## (undated) DEVICE — DYONICS 25 PATIENT TUBE SET MUST                                    BE USED WITH 7211007, 12 PER BOX

## (undated) DEVICE — DECANTER FLD 9IN ST BG FOR ASEP TRNSF OF FLD

## (undated) DEVICE — GLOVE ORANGE PI 7 1/2   MSG9075

## (undated) DEVICE — DRESSING PETRO W3XL3IN OIL EMUL N ADH GZ KNIT IMPREG CELOS

## (undated) DEVICE — ZIMMER® STERILE DISPOSABLE TOURNIQUET CUFF WITH PLC, DUAL PORT, SINGLE BLADDER, 18 IN. (46 CM)

## (undated) DEVICE — ZIMMER® STERILE DISPOSABLE TOURNIQUET CUFF WITH PLC, DUAL PORT, SINGLE BLADDER, 34 IN. (86 CM)

## (undated) DEVICE — SYRINGE MED 10ML LUERLOCK TIP W/O SFTY DISP

## (undated) DEVICE — BANDAGE COMPR W2INXL5YD TAN BRTH SELF ADH WRP W/ HND TEAR

## (undated) DEVICE — COVER LT HNDL BLU PLAS

## (undated) DEVICE — SUTURE MCRYL + SZ 4-0 L27IN ABSRB UD L19MM PS-2 3/8 CIR MCP426H

## (undated) DEVICE — NEEDLE HYPO 18GA L1.5IN THN WALL PIVOTING SHLD BVL ORIENTED

## (undated) DEVICE — GLOVE SURG SZ 75 CRM LTX FREE POLYISOPRENE POLYMER BEAD ANTI

## (undated) DEVICE — SUTURE MCRYL + SZ 4-0 L18IN ABSRB UD L19MM PS-2 3/8 CIR MCP496G

## (undated) DEVICE — WRAP COHESIVE W2INXL5YD TAN SELF ADH BNDG HND NON STERILE TEAR CARING

## (undated) DEVICE — NEEDLE HYPO 25GA L1.5IN BVL ORIENTED ECLIPSE

## (undated) DEVICE — FAIRFIELD KNEE LF

## (undated) DEVICE — YANKAUER SUCTION INSTRUMENT NO CONTROL VENT, BULB TIP, CLEAR: Brand: YANKAUER

## (undated) DEVICE — 450 ML BOTTLE OF 0.05% CHLORHEXIDINE GLUCONATE IN 99.95% STERILE WATER FOR IRRIGATION, USP AND APPLICATOR.: Brand: IRRISEPT ANTIMICROBIAL WOUND LAVAGE

## (undated) DEVICE — SPONGE GZ W4XL4IN COT 12 PLY TYP VII WVN C FLD DSGN

## (undated) DEVICE — GOWN,SIRUS,POLYRNF,BRTHSLV,XL,30/CS: Brand: MEDLINE

## (undated) DEVICE — SOLUTION IV IRRIG 250ML ST LF 0.9% SODIUM 2F7122

## (undated) DEVICE — STERILE TOTAL KNEE DRAPE PACK: Brand: CARDINAL HEALTH

## (undated) DEVICE — CONTAINER,SPECIMEN,OR STERILE,4OZ: Brand: MEDLINE

## (undated) DEVICE — UNDERGLOVE SURG SZ 8 FNGR THK0.21MIL GRN LTX BEAD CUF

## (undated) DEVICE — SUTURE VCRL + SZ 1 L18IN ABSRB UD L36MM CT-1 1/2 CIR VCP841D

## (undated) DEVICE — DYONICS 25 DAY TUBE SET MUST BE                                    USED WITH 7211008, 3 PER BOX

## (undated) DEVICE — TOTAL BASIC PK

## (undated) DEVICE — PADDING CAST W6INXL4YD ST COT RAYON MICROPLEATED HIGHLY

## (undated) DEVICE — NEEDLE SPNL L3.5IN PNK HUB S STL REG WALL FIT STYL W/ QNCKE

## (undated) DEVICE — SOLUTION IRRIG 500ML 0.9% SOD CHL USP POUR PLAS BTL